# Patient Record
Sex: FEMALE | Race: WHITE | NOT HISPANIC OR LATINO | Employment: OTHER | URBAN - METROPOLITAN AREA
[De-identification: names, ages, dates, MRNs, and addresses within clinical notes are randomized per-mention and may not be internally consistent; named-entity substitution may affect disease eponyms.]

---

## 2018-11-06 PROBLEM — K63.1 PERFORATION BOWEL: Status: ACTIVE | Noted: 2018-11-06

## 2018-11-06 PROBLEM — Z12.11 COLON CANCER SCREENING: Status: ACTIVE | Noted: 2018-11-06

## 2018-11-07 PROBLEM — K63.1 PERFORATION BOWEL: Status: ACTIVE | Noted: 2018-11-07

## 2018-11-07 PROBLEM — E78.5 HYPERLIPIDEMIA: Status: ACTIVE | Noted: 2018-11-07

## 2018-11-07 PROBLEM — I10 ESSENTIAL HYPERTENSION: Status: ACTIVE | Noted: 2018-11-07

## 2018-11-07 PROBLEM — K63.1 PERFORATION BOWEL: Status: RESOLVED | Noted: 2018-11-06 | Resolved: 2018-11-07

## 2018-11-08 PROBLEM — Z90.49 S/P LAPAROSCOPIC COLECTOMY: Chronic | Status: ACTIVE | Noted: 2018-11-08

## 2018-11-09 PROBLEM — E87.6 HYPOKALEMIA: Status: ACTIVE | Noted: 2018-11-09

## 2018-11-09 PROBLEM — E83.42 HYPOMAGNESEMIA: Status: ACTIVE | Noted: 2018-11-09

## 2018-11-09 PROBLEM — K56.7 ILEUS: Status: ACTIVE | Noted: 2018-11-09

## 2018-11-10 PROBLEM — Z90.49 S/P LAPAROSCOPIC COLECTOMY: Chronic | Status: RESOLVED | Noted: 2018-11-08 | Resolved: 2018-11-10

## 2018-11-10 PROBLEM — K63.1 PERFORATION BOWEL: Status: RESOLVED | Noted: 2018-11-07 | Resolved: 2018-11-10

## 2018-11-10 PROBLEM — E83.42 HYPOMAGNESEMIA: Status: RESOLVED | Noted: 2018-11-09 | Resolved: 2018-11-10

## 2018-11-10 PROBLEM — E87.6 HYPOKALEMIA: Status: RESOLVED | Noted: 2018-11-09 | Resolved: 2018-11-10

## 2018-11-10 PROBLEM — K56.7 ILEUS: Status: RESOLVED | Noted: 2018-11-09 | Resolved: 2018-11-10

## 2018-11-12 ENCOUNTER — TELEPHONE (OUTPATIENT)
Dept: SURGERY | Facility: CLINIC | Age: 58
End: 2018-11-12

## 2018-11-12 RX ORDER — AMOXICILLIN AND CLAVULANATE POTASSIUM 875; 125 MG/1; MG/1
1 TABLET, FILM COATED ORAL EVERY 12 HOURS
Qty: 10 TABLET | Refills: 0 | Status: SHIPPED | OUTPATIENT
Start: 2018-11-12 | End: 2018-11-17

## 2018-11-12 NOTE — TELEPHONE ENCOUNTER
Prescription of [facility]in sent to [facility] Per MD advised patient she can start to eat food as tolerated, and resume home medications as prescribed. Post-op appointment scheduled.

## 2018-11-12 NOTE — TELEPHONE ENCOUNTER
----- Message from Gavi Burton sent at 11/12/2018 10:16 AM CST -----  Type: Needs Medical Advice    Who Called:  Patient  Pharmacy name and phone #:    [facility] [place] [address]  [address]  [place] [address]  Phone: [phone] Fax: [phone]  Best Call Back Number:[phone] (home)     Additional Information: stating the pharmacy did not receive her antibiotics prescribed by Dr. Mcknight, was discharged from the hospital on Saturday, please contact

## 2018-11-12 NOTE — TELEPHONE ENCOUNTER
----- Message from Marizol Mock sent at 11/12/2018  8:32 AM CST -----  Contact: self  Patient would like a call back regarding her discharge 11/10/18. Patient has  Type: Needs Medical Advice    Who Called:  self  Symptoms (please be specific):  diarrhea  How long has patient had these symptoms:  Discharged from [facility] 11/10/18  Best Call Back Number: [phone]  Additional Information: patient needs to know when she will be able to stop eating bland foods. Please call patient. Thanks!

## 2018-11-13 ENCOUNTER — TELEPHONE (OUTPATIENT)
Dept: SURGERY | Facility: CLINIC | Age: 58
End: 2018-11-13

## 2018-11-13 ENCOUNTER — NURSE TRIAGE (OUTPATIENT)
Dept: ADMINISTRATIVE | Facility: CLINIC | Age: 58
End: 2018-11-13

## 2018-11-13 RX ORDER — CIPROFLOXACIN 500 MG/1
500 TABLET ORAL EVERY 12 HOURS
Qty: 10 TABLET | Refills: 0 | Status: SHIPPED | OUTPATIENT
Start: 2018-11-13 | End: 2019-05-03 | Stop reason: ALTCHOICE

## 2018-11-13 NOTE — TELEPHONE ENCOUNTER
----- Message from Marizol Mock sent at 11/13/2018  9:07 AM CST -----  Contact: Susanna with [facility]  Susanna with [facility] [phone] called regarding Augmentin for above patient. They are requesting one of the following: Rx substitution due to causing stomach problems. Thanks!    [facility] [place] [address]  [address]  [place] [address]  Phone: [phone] Fax: [phone]

## 2018-11-13 NOTE — TELEPHONE ENCOUNTER
Spoke with patient regarding her symptoms. Patient stated she experienced stomach pain and projectile vomiting after taking 1 dose of Augmentin. Discussed this matter with Dr Mcknight. Patient has been informed she is to stop the Augmentin and a new order for Cipro was placed by MD to be picked up at her pharmacy. Patient indicated understanding of the information provided to her. No further issues discussed.

## 2018-11-14 NOTE — TELEPHONE ENCOUNTER
Reason for Disposition   Caller has medication question, adult has minor symptoms, caller declines triage, and triager answers question    Protocols used: ST MEDICATION QUESTION CALL-A-AH    Pt asking if she can eat with cipro. Pt notified she can eat regular diet with cipro, but to avoid dairy products for approx 2 hours before and after cipro. Pt verbalizes understanding of instructions.

## 2018-11-19 ENCOUNTER — TELEPHONE (OUTPATIENT)
Dept: SURGERY | Facility: CLINIC | Age: 58
End: 2018-11-19

## 2018-11-19 RX ORDER — FLUCONAZOLE 100 MG/1
100 TABLET ORAL DAILY
Qty: 30 TABLET | Refills: 0 | Status: SHIPPED | OUTPATIENT
Start: 2018-11-19 | End: 2018-12-19

## 2018-11-19 NOTE — TELEPHONE ENCOUNTER
----- Message from America Gore sent at 11/19/2018  8:40 AM CST -----  Contact: Patient  Type: Needs Medical Advice    Who Called:  Fiona patient  Symptoms (please be specific):  Burning around surgical area  How long has patient had these symptoms:  Several days  Pharmacy name and phone #:    [facility] [place] [address]  [address]  [place] [address]  Phone: [phone] Fax: [phone]  Best Call Back Number: [phone]  Additional Information: Calling to ask for a antibiotic. Please advise. Thanks.

## 2018-11-19 NOTE — TELEPHONE ENCOUNTER
Spoke with patient regarding her symptoms. Patient stated she was experiencing a burning sensation when she urinates. Patient recently was taking an antibiotic. Discussed this matter with Dr Mcknight and he ordered Diflucan. Informed patient about the new medication that was ordered, indicated understanding. No further issues discussed.

## 2018-11-21 ENCOUNTER — OFFICE VISIT (OUTPATIENT)
Dept: SURGERY | Facility: CLINIC | Age: 58
End: 2018-11-21
Payer: MEDICAID

## 2018-11-21 VITALS
DIASTOLIC BLOOD PRESSURE: 75 MMHG | SYSTOLIC BLOOD PRESSURE: 131 MMHG | BODY MASS INDEX: 25.34 KG/M2 | HEART RATE: 97 BPM | HEIGHT: 65 IN | WEIGHT: 152.13 LBS

## 2018-11-21 DIAGNOSIS — F17.200 SMOKER: Primary | ICD-10-CM

## 2018-11-21 PROCEDURE — 99213 OFFICE O/P EST LOW 20 MIN: CPT | Mod: PBBFAC,PN | Performed by: SURGERY

## 2018-11-21 PROCEDURE — 99999 PR PBB SHADOW E&M-EST. PATIENT-LVL III: CPT | Mod: PBBFAC,,, | Performed by: SURGERY

## 2018-11-21 PROCEDURE — 99024 POSTOP FOLLOW-UP VISIT: CPT | Mod: ,,, | Performed by: SURGERY

## 2018-11-21 RX ORDER — HYDROCODONE BITARTRATE AND ACETAMINOPHEN 10; 325 MG/1; MG/1
TABLET ORAL
Refills: 0 | COMMUNITY
Start: 2018-11-12 | End: 2022-08-11

## 2018-11-21 NOTE — PROGRESS NOTES
"Fiona Hernandez is a 58 y.o. female patient.   2 weeks s/p Lap LAR  Doing well  Ezra diet  Told ok to shower  States ezra diet  BM mostly normal  Some pains but controlled    No diagnosis found.  Past Medical History:   Diagnosis Date    Back pain     Bowel perforation 11/06/2018    Diverticulitis     Hyperlipidemia     Hypertension     Peritoneal cavity free air 11/06/2018     No past surgical history pertinent negatives on file.  Scheduled Meds:  Continuous Infusions:  PRN Meds:    Review of patient's allergies indicates:   Allergen Reactions    Sulfa (sulfonamide antibiotics) Shortness Of Breath     There are no hospital problems to display for this patient.    Blood pressure 131/75, pulse 97, height 5' 5" (1.651 m), weight 69 kg (152 lb 1.9 oz).    Subjective:   Diet: Adequate intake.  Patient reports no nausea.    Activity level: Normal.    Pain control: Well controlled.      Objective:  Vital signs (most recent): Blood pressure 131/75, pulse 97, height 5' 5" (1.651 m), weight 69 kg (152 lb 1.9 oz).  General appearance: Comfortable.    Lungs:  Normal effort.    Heart: Normal rate.    Abdomen: Abdomen is soft.    Bowel sounds:  Bowel sounds are normal.    Tenderness: There is no abdominal tenderness tenderness.    Wound:  Clean.    Path reviewed-ischemic necrosis, contained perforation     Assessment:   Condition: In stable condition.       s/p Lap colon  RTC 1 month       Cedric Mcknight MD  11/21/2018  "

## 2018-12-10 ENCOUNTER — TELEPHONE (OUTPATIENT)
Dept: SURGERY | Facility: CLINIC | Age: 58
End: 2018-12-10

## 2018-12-10 NOTE — TELEPHONE ENCOUNTER
----- Message from Bushra Burgess sent at 12/10/2018  4:46 PM CST -----  Contact: [phone]  Patient is requesting a call back from the nurse concerning lab and upcoming appt.    Please call the patient upon request at phone number [phone].

## 2018-12-10 NOTE — TELEPHONE ENCOUNTER
Returned patient's call reference to question about lab work. Patient's inquiry is are recent labs available for upcoming  visit Patient was assured all of recent labs are available for upcoming visit on 12/14/2018 No further issues were discussed.

## 2018-12-14 ENCOUNTER — OFFICE VISIT (OUTPATIENT)
Dept: SURGERY | Facility: CLINIC | Age: 58
End: 2018-12-14
Payer: MEDICAID

## 2018-12-14 VITALS
SYSTOLIC BLOOD PRESSURE: 136 MMHG | HEART RATE: 78 BPM | DIASTOLIC BLOOD PRESSURE: 80 MMHG | WEIGHT: 152.56 LBS | BODY MASS INDEX: 24.52 KG/M2 | HEIGHT: 66 IN

## 2018-12-14 DIAGNOSIS — Z98.890 POST-OPERATIVE STATE: Primary | ICD-10-CM

## 2018-12-14 PROCEDURE — 99213 OFFICE O/P EST LOW 20 MIN: CPT | Mod: PBBFAC,PN | Performed by: SURGERY

## 2018-12-14 PROCEDURE — 99999 PR PBB SHADOW E&M-EST. PATIENT-LVL III: CPT | Mod: PBBFAC,,, | Performed by: SURGERY

## 2018-12-14 PROCEDURE — 99024 POSTOP FOLLOW-UP VISIT: CPT | Mod: ,,, | Performed by: SURGERY

## 2018-12-14 NOTE — PROGRESS NOTES
"Fiona Hernandez is a 58 y.o. female patient.   She is 4 weeks s/p Lap LAR  Doing great  ezra diet  Some lower abd pains, intermittent but better    Incisions well healed  No diagnosis found.  Past Medical History:   Diagnosis Date    Back pain     Bowel perforation 11/06/2018    Diverticulitis     Hyperlipidemia     Hypertension     Peritoneal cavity free air 11/06/2018     No past surgical history pertinent negatives on file.  Scheduled Meds:  Continuous Infusions:  PRN Meds:    Review of patient's allergies indicates:   Allergen Reactions    Sulfa (sulfonamide antibiotics) Shortness Of Breath     There are no hospital problems to display for this patient.    Blood pressure 136/80, pulse 78, height 5' 6" (1.676 m), weight 69.2 kg (152 lb 8.9 oz).    Subjective:   Diet: Adequate intake.    Pain control: Well controlled.      Objective:  Vital signs (most recent): Blood pressure 136/80, pulse 78, height 5' 6" (1.676 m), weight 69.2 kg (152 lb 8.9 oz).  General appearance: Comfortable.    Lungs:  Normal effort.    Heart: Normal rate.    Abdomen: Abdomen is soft.    Bowel sounds:  Bowel sounds are normal.    Tenderness: There is no abdominal tenderness tenderness.    Wound:  Clean.       Assessment:   Condition: In stable condition.       1 month s/p Lap LAR, for perforated viscus  Follow up with GI for completion colonoscopy  RTC PRN       Cedric Mcknight MD  12/14/2018  "

## 2019-04-15 ENCOUNTER — OFFICE VISIT (OUTPATIENT)
Dept: SURGERY | Facility: CLINIC | Age: 59
End: 2019-04-15
Payer: MEDICAID

## 2019-04-15 VITALS
BODY MASS INDEX: 24.04 KG/M2 | DIASTOLIC BLOOD PRESSURE: 71 MMHG | HEART RATE: 86 BPM | WEIGHT: 148.94 LBS | SYSTOLIC BLOOD PRESSURE: 131 MMHG

## 2019-04-15 DIAGNOSIS — R10.30 LOWER ABDOMINAL PAIN: Primary | ICD-10-CM

## 2019-04-15 DIAGNOSIS — R10.84 GENERALIZED ABDOMINAL PAIN: ICD-10-CM

## 2019-04-15 PROCEDURE — 99999 PR PBB SHADOW E&M-EST. PATIENT-LVL III: ICD-10-PCS | Mod: PBBFAC,,, | Performed by: SURGERY

## 2019-04-15 PROCEDURE — 99214 OFFICE O/P EST MOD 30 MIN: CPT | Mod: S$PBB,,, | Performed by: SURGERY

## 2019-04-15 PROCEDURE — 99214 PR OFFICE/OUTPT VISIT, EST, LEVL IV, 30-39 MIN: ICD-10-PCS | Mod: S$PBB,,, | Performed by: SURGERY

## 2019-04-15 PROCEDURE — 99999 PR PBB SHADOW E&M-EST. PATIENT-LVL III: CPT | Mod: PBBFAC,,, | Performed by: SURGERY

## 2019-04-15 PROCEDURE — 99213 OFFICE O/P EST LOW 20 MIN: CPT | Mod: PBBFAC,PN | Performed by: SURGERY

## 2019-04-15 NOTE — PROGRESS NOTES
History & Physical    SUBJECTIVE:     History of Present Illness:  Patient is a 59 y.o. female presents with lower abdominal/pelvic pain since surgery 5 months ago. States has trouble sleeping on her right side because her ribs hurt.  Also gets pain in pelvis and is unable to have intercourse, states she feels the pain in her pelvis.  She had a lap LAR done last November for a perforation during colonoscopy.  Discussed her symptoms and will get a CT scan to evaluate    Chief Complaint   Patient presents with    Pelvis - Pain, Numbness       Review of patient's allergies indicates:   Allergen Reactions    Sulfa (sulfonamide antibiotics) Shortness Of Breath       Current Outpatient Medications   Medication Sig Dispense Refill    amLODIPine (NORVASC) 5 MG tablet Take 5 mg by mouth once daily.      aspirin (ECOTRIN) 81 MG EC tablet Take 81 mg by mouth once daily.      atorvastatin (LIPITOR) 80 MG tablet Take 80 mg by mouth every evening.      carvedilol (COREG) 6.25 MG tablet Take 6.25 mg by mouth 2 (two) times daily with meals.      ciprofloxacin HCl (CIPRO) 500 MG tablet Take 1 tablet (500 mg total) by mouth every 12 (twelve) hours. 10 tablet 0    diazePAM (VALIUM) 10 MG Tab Take 10 mg by mouth 2 (two) times daily as needed.      gabapentin (NEURONTIN) 300 MG capsule Take 300 mg by mouth 2 (two) times daily.      HYDROcodone-acetaminophen (NORCO)  mg per tablet TK 1 T PO BID PRN P  0    lisinopril (PRINIVIL,ZESTRIL) 20 MG tablet Take 20 mg by mouth once daily.      pantoprazole (PROTONIX) 40 MG tablet Take 40 mg by mouth once daily.       No current facility-administered medications for this visit.        Past Medical History:   Diagnosis Date    Back pain     Bowel perforation 11/06/2018    Diverticulitis     Hyperlipidemia     Hypertension     Peritoneal cavity free air 11/06/2018     Past Surgical History:   Procedure Laterality Date    APPENDECTOMY  2017    CHOLECYSTECTOMY       COLECTOMY, LAPAROSCOPIC  11/7/2018    Per[facility]night [facility] at [facility]    COLONOSCOPY  11/06/2018    COLONOSCOPY N/A 11/6/2018    Performed by Cash Little MD at [facility] ENDO    CORONARY ANGIOPLASTY WITH STENT PLACEMENT  2016    1 stent     HYSTERECTOMY      LAPAROSCOPY, DIAGNOSTIC N/A 11/7/2018    Performed by [facility]ric [facility]night [facility] at [facility] OR     History reviewed. No pertinent family history.  Social History     Tobacco Use    Smoking status: Current Some Day Smoker    Smokeless tobacco: Never Used   Substance Use Topics    Alcohol use: Not on file    Drug use: Not on file        Review of Systems:  Review of Systems   Constitutional: Negative for appetite change, fatigue, fever and unexpected weight change.   HENT: Negative for sore throat and trouble swallowing.    Eyes: Negative.    Respiratory: Negative for cough, shortness of breath and wheezing.    Cardiovascular: Negative for chest pain and leg swelling.   Gastrointestinal: Positive for abdominal pain and rectal pain. Negative for abdominal distention, blood in stool, constipation, diarrhea, nausea and vomiting.   Endocrine: Negative.    Genitourinary: Negative.    Musculoskeletal: Negative for back pain.   Skin: Negative.  Negative for rash.   Allergic/Immunologic: Negative.    Neurological: Negative.    Hematological: Negative.    Psychiatric/Behavioral: Negative for confusion.       OBJECTIVE:     Vital Signs (Most Recent)  Pulse: 86 (04/15/19 1118)  BP: 131/71 (04/15/19 1118)     67.5 kg (148 lb 14.7 oz)     Physical Exam:  Physical Exam   Constitutional: She is oriented to person, place, and time. She appears well-developed and well-nourished.   HENT:   Head: Normocephalic and atraumatic.   Eyes: EOM are normal.   Neck: Normal range of motion.   Cardiovascular: Normal rate and normal heart sounds.   Pulmonary/Chest: Effort normal.   Abdominal: Soft. Bowel sounds are normal. She exhibits no distension and no mass.  There is tenderness (mild). There is no rebound and no guarding. No hernia.   Musculoskeletal: Normal range of motion.   Neurological: She is alert and oriented to person, place, and time.   Skin: Skin is warm and dry. Capillary refill takes less than 2 seconds.   Psychiatric: She has a normal mood and affect. Her behavior is normal.   Nursing note and vitals reviewed.        ASSESSMENT/PLAN:     Abdominal pain, pelvic and r side s/p lap LAR    PLAN:Plan     CT scan  RTC 2 weeks

## 2019-04-17 ENCOUNTER — NURSE TRIAGE (OUTPATIENT)
Dept: ADMINISTRATIVE | Facility: CLINIC | Age: 59
End: 2019-04-17

## 2019-04-18 NOTE — TELEPHONE ENCOUNTER
Pt is having fasting labs tomorrow morning at 11 am.She wasnted to make sure she can take her statin and gabapentin tonight, I advised her that is fine as her instructions tell her to take all regularly scheduled medications, caller agrees

## 2019-04-23 DIAGNOSIS — R10.84 GENERALIZED ABDOMINAL PAIN: ICD-10-CM

## 2019-04-29 ENCOUNTER — TELEPHONE (OUTPATIENT)
Dept: SURGERY | Facility: CLINIC | Age: 59
End: 2019-04-29

## 2019-04-29 NOTE — TELEPHONE ENCOUNTER
----- Message from Chiara Gary sent at 4/29/2019 11:33 AM CDT -----    Type: Needs Medical Advice    Who Called:  pt  Best Call Back Number: [phone]  Additional Information:  Pt  Calling to  Speak to the nurse about a  Ct scan and  Needs advice

## 2019-04-30 ENCOUNTER — TELEPHONE (OUTPATIENT)
Dept: ORTHOPEDICS | Facility: CLINIC | Age: 59
End: 2019-04-30

## 2019-04-30 NOTE — TELEPHONE ENCOUNTER
----- Message from Antolin Barreto sent at 4/30/2019  8:20 AM CDT -----  Contact: patient  Type: Needs Medical Advice    Who Called:  Patient  Symptoms (please be specific):    How long has patient had these symptoms:    Pharmacy name and phone #:    Best Call Back Number: [phone]  Additional Information: requesting a call back from Arcadio regarding procedure.

## 2019-05-03 ENCOUNTER — OFFICE VISIT (OUTPATIENT)
Dept: SURGERY | Facility: CLINIC | Age: 59
End: 2019-05-03
Payer: MEDICAID

## 2019-05-03 VITALS
BODY MASS INDEX: 24.11 KG/M2 | HEIGHT: 66 IN | DIASTOLIC BLOOD PRESSURE: 66 MMHG | SYSTOLIC BLOOD PRESSURE: 99 MMHG | WEIGHT: 150 LBS | HEART RATE: 63 BPM

## 2019-05-03 DIAGNOSIS — R10.30 LOWER ABDOMINAL PAIN: Primary | ICD-10-CM

## 2019-05-03 PROCEDURE — 99999 PR PBB SHADOW E&M-EST. PATIENT-LVL III: CPT | Mod: PBBFAC,,, | Performed by: SURGERY

## 2019-05-03 PROCEDURE — 99213 OFFICE O/P EST LOW 20 MIN: CPT | Mod: PBBFAC,PN | Performed by: SURGERY

## 2019-05-03 PROCEDURE — 99999 PR PBB SHADOW E&M-EST. PATIENT-LVL III: ICD-10-PCS | Mod: PBBFAC,,, | Performed by: SURGERY

## 2019-05-03 PROCEDURE — 99214 OFFICE O/P EST MOD 30 MIN: CPT | Mod: S$PBB,,, | Performed by: SURGERY

## 2019-05-03 PROCEDURE — 99214 PR OFFICE/OUTPT VISIT, EST, LEVL IV, 30-39 MIN: ICD-10-PCS | Mod: S$PBB,,, | Performed by: SURGERY

## 2019-05-03 NOTE — PROGRESS NOTES
"Fiona Hernandez is a 59 y.o. female patient.   She is 6 months status post colon resection for perforated colon during colonoscopy.  She states she has excessive gas from her bottom.  She also states that she gets tenderness and pain when she has attempted intercourse.  CT scan was ordered to evaluate her anastomosis.  CT scan did not show any significant abnormalities in her intestines, her anastomosis looks perfect with no narrowing, no fluid collection around it, and no worrisome findings in the pelvis.  She does have some atrophy of her vagina.  Discussed possibly using estrogen cream to help with this problem.  Otherwise patient is tolerating regular food having bowel movements is eating okay and just complains of pelvic and lower abdominal pains occasionally.    No diagnosis found.  Past Medical History:   Diagnosis Date    Back pain     Bowel perforation 11/06/2018    Diverticulitis     Hyperlipidemia     Hypertension     Peritoneal cavity free air 11/06/2018     No past surgical history pertinent negatives on file.  Scheduled Meds:  Continuous Infusions:  PRN Meds:    Review of patient's allergies indicates:   Allergen Reactions    Sulfa (sulfonamide antibiotics) Shortness Of Breath     There are no hospital problems to display for this patient.    Blood pressure 99/66, pulse 63, height 5' 6" (1.676 m), weight 68.1 kg (150 lb 0.4 oz).    Subjective:   Diet: Adequate intake.  Patient reports no nausea.    Activity level: Normal.    Pain control: Partially controlled.      Objective:  Vital signs (most recent): Blood pressure 99/66, pulse 63, height 5' 6" (1.676 m), weight 68.1 kg (150 lb 0.4 oz).  General appearance: Comfortable.    Lungs:  Normal effort.    Heart: Normal rate.    Abdomen: Abdomen is soft.    Bowel sounds:  Bowel sounds are normal.    Tenderness: There is no abdominal tenderness tenderness.    Wound:  Clean.    Extremities: There is normal range of motion.    Neurological: The patient is " alert.       Assessment:   Condition: In stable condition.       s/p Colectomy, 6 months ago  RTC PRN  CT scan negative       Cedric Mcknight MD  5/3/2019

## 2019-05-06 ENCOUNTER — TELEPHONE (OUTPATIENT)
Dept: SURGERY | Facility: CLINIC | Age: 59
End: 2019-05-06

## 2019-05-06 NOTE — TELEPHONE ENCOUNTER
----- Message from Sae Mojica sent at 5/6/2019 10:47 AM CDT -----  Contact: self   Patient want to speak with a nurse regarding some medical questions please call back at [phone] (home)

## 2019-05-06 NOTE — TELEPHONE ENCOUNTER
Spoke with patient about a request for a copy of the color imaging that Dr Mcknight showed her after her colon surgery on 11/7/18.

## 2019-05-07 NOTE — TELEPHONE ENCOUNTER
Pt called in requesting imaging in color from her surgery on 11/6/2018   ----- Message from RT Mike sent at 5/7/2019  2:20 PM CDT -----  Contact: pt    pt , requesting her original pictures back, been waiting since last week, thanks.

## 2019-06-28 PROBLEM — F41.1 GENERALIZED ANXIETY DISORDER: Status: ACTIVE | Noted: 2019-06-28

## 2019-06-28 PROBLEM — E78.00 HYPERCHOLESTEREMIA: Status: ACTIVE | Noted: 2019-06-28

## 2019-06-28 PROBLEM — I73.9 PAD (PERIPHERAL ARTERY DISEASE): Status: ACTIVE | Noted: 2019-06-28

## 2019-06-28 PROBLEM — M79.89 LEG SWELLING: Status: ACTIVE | Noted: 2019-06-28

## 2019-06-28 PROBLEM — F17.200 TOBACCO DEPENDENCE: Status: ACTIVE | Noted: 2019-06-28

## 2019-07-19 ENCOUNTER — TELEPHONE (OUTPATIENT)
Dept: SURGERY | Facility: CLINIC | Age: 59
End: 2019-07-19

## 2019-07-19 PROBLEM — I87.2 VENOUS INSUFFICIENCY (CHRONIC) (PERIPHERAL): Status: ACTIVE | Noted: 2019-06-28

## 2019-07-19 PROBLEM — I77.9 PAOD (PERIPHERAL ARTERIAL OCCLUSIVE DISEASE): Status: ACTIVE | Noted: 2019-07-19

## 2019-07-19 PROBLEM — L08.9 SKIN INFECTION, BACTERIAL: Status: ACTIVE | Noted: 2019-07-19

## 2019-07-19 PROBLEM — B96.89 SKIN INFECTION, BACTERIAL: Status: ACTIVE | Noted: 2019-07-19

## 2019-07-19 NOTE — TELEPHONE ENCOUNTER
----- Message from America Sofía sent at 7/19/2019 12:46 PM CDT -----  Contact: Patient  Type: Needs Medical Advice    Who Called:  Fiona, patient  Symptoms (please be specific):  Incision site is red and swollen  How long has patient had these symptoms:  Today  Pharmacy name and phone #:    [facility] [place] [place] - [address]  [address]  [place] [address]  Phone: [phone] Fax: [phone]  Best Call Back Number: [phone]  Additional Information: Calling because her incision site is red and swollen. Please call her. Thanks.

## 2019-07-19 NOTE — TELEPHONE ENCOUNTER
Spoke with patient. She is currently visiting her cardiologist, Dr. Garcia. I spoke with Dr. Garcia. He stated she does show infection with purulent drainage in the lower abdominal region. It has been drained. Patient to begin Clindamycin 300 mg and f/u with Dr. Mcknight. Appointment scheduled for 7/22/19. Patient aware of appointment date, time, and location. Patient verbalized understanding; no other issues discussed.

## 2019-07-22 ENCOUNTER — OFFICE VISIT (OUTPATIENT)
Dept: SURGERY | Facility: CLINIC | Age: 59
End: 2019-07-22
Payer: MEDICAID

## 2019-07-22 VITALS
DIASTOLIC BLOOD PRESSURE: 73 MMHG | BODY MASS INDEX: 23.98 KG/M2 | HEART RATE: 82 BPM | SYSTOLIC BLOOD PRESSURE: 127 MMHG | WEIGHT: 148.56 LBS

## 2019-07-22 DIAGNOSIS — T81.41XA POSTOPERATIVE STITCH ABSCESS: Primary | ICD-10-CM

## 2019-07-22 PROCEDURE — 99213 PR OFFICE/OUTPT VISIT, EST, LEVL III, 20-29 MIN: ICD-10-PCS | Mod: S$PBB,,, | Performed by: SURGERY

## 2019-07-22 PROCEDURE — 99999 PR PBB SHADOW E&M-EST. PATIENT-LVL III: ICD-10-PCS | Mod: PBBFAC,,, | Performed by: SURGERY

## 2019-07-22 PROCEDURE — 99213 OFFICE O/P EST LOW 20 MIN: CPT | Mod: S$PBB,,, | Performed by: SURGERY

## 2019-07-22 PROCEDURE — 99213 OFFICE O/P EST LOW 20 MIN: CPT | Mod: PBBFAC,PN | Performed by: SURGERY

## 2019-07-22 PROCEDURE — 99999 PR PBB SHADOW E&M-EST. PATIENT-LVL III: CPT | Mod: PBBFAC,,, | Performed by: SURGERY

## 2019-07-22 RX ORDER — ERGOCALCIFEROL 1.25 MG/1
50000 CAPSULE ORAL
COMMUNITY
End: 2023-08-31 | Stop reason: SDUPTHER

## 2019-07-29 NOTE — PROGRESS NOTES
History & Physical    SUBJECTIVE:     History of Present Illness:  Patient is a 59 y.o. female presents with small stitch abscess to RLQ incision site  It was drainedin clinic last week at her Cardiology visit  Looks clean and dry today, no cellulitis  She states she gets some occasional abd pain as well but nonspecific  Chief Complaint   Patient presents with    Wound Infection       Review of patient's allergies indicates:   Allergen Reactions    Sulfa (sulfonamide antibiotics) Shortness Of Breath       Current Outpatient Medications   Medication Sig Dispense Refill    amLODIPine (NORVASC) 5 MG tablet Take 5 mg by mouth once daily.      aspirin (ECOTRIN) 81 MG EC tablet Take 81 mg by mouth once daily.      atorvastatin (LIPITOR) 80 MG tablet Take 80 mg by mouth every evening.      carvedilol (COREG) 6.25 MG tablet Take 6.25 mg by mouth 2 (two) times daily with meals.      clindamycin (CLEOCIN) 300 MG capsule Take 1 capsule (300 mg total) by mouth every 8 (eight) hours. for 10 days 30 capsule 1    diazePAM (VALIUM) 10 MG Tab Take 10 mg by mouth 2 (two) times daily as needed.      ergocalciferol (VITAMIN D2) 50,000 unit Cap Take 50,000 Units by mouth every 7 days.      gabapentin (NEURONTIN) 300 MG capsule Take 300 mg by mouth 2 (two) times daily.      HYDROcodone-acetaminophen (NORCO)  mg per tablet TK 1 T PO BID PRN P  0    lisinopril (PRINIVIL,ZESTRIL) 20 MG tablet Take 20 mg by mouth once daily.      pantoprazole (PROTONIX) 40 MG tablet Take 40 mg by mouth once daily.       No current facility-administered medications for this visit.        Past Medical History:   Diagnosis Date    Back pain     Bowel perforation 11/06/2018    Diverticulitis     Hyperlipidemia     Hypertension     Peritoneal cavity free air 11/06/2018     Past Surgical History:   Procedure Laterality Date    APPENDECTOMY  2017    CHOLECYSTECTOMY      COLECTOMY, LAPAROSCOPIC  11/7/2018    Performed by Cedric WOODS  MD [place] at [facility]    COLONOSCOPY  11/06/2018    COLONOSCOPY N/A 11/6/2018    Performed by Cash Little MD at [facility] ENDO    CORONARY ANGIOPLASTY WITH STENT PLACEMENT  2016    1 stent     HYSTERECTOMY      LAPAROSCOPY, DIAGNOSTIC N/A 11/7/2018    Performed by [facility]edric Mcknight MD at [facility] OR     History reviewed. No pertinent family history.  Social History     Tobacco Use    Smoking status: Current Some Day Smoker    Smokeless tobacco: Never Used   Substance Use Topics    Alcohol use: Not on file    Drug use: Not on file        Review of Systems:  Review of Systems   Constitutional: Negative for appetite change, fatigue, fever and unexpected weight change.   HENT: Negative for sore throat and trouble swallowing.    Eyes: Negative.    Respiratory: Negative for cough, shortness of breath and wheezing.    Cardiovascular: Negative for chest pain and leg swelling.   Gastrointestinal: Negative for abdominal distention, abdominal pain, blood in stool, constipation, diarrhea, nausea and vomiting.   Endocrine: Negative.    Genitourinary: Negative.    Musculoskeletal: Negative for back pain.   Skin: Negative.  Negative for rash.   Allergic/Immunologic: Negative.    Neurological: Negative.    Hematological: Negative.    Psychiatric/Behavioral: Negative for confusion.       OBJECTIVE:     Vital Signs (Most Recent)  Pulse: 82 (07/22/19 1158)  BP: 127/73 (07/22/19 1158)     67.4 kg (148 lb 9.4 oz)     Physical Exam:  Physical Exam   Constitutional: She is oriented to person, place, and time. She appears well-developed and well-nourished.   HENT:   Head: Normocephalic and atraumatic.   Eyes: EOM are normal.   Neck: Normal range of motion.   Cardiovascular: Normal rate and normal heart sounds.   Pulmonary/Chest: Effort normal.   Abdominal: Soft. Bowel sounds are normal. She exhibits no distension. There is tenderness (mild). There is no rebound. No hernia.   Small pinhole area where she had drainage,  no sign of infection   Musculoskeletal: Normal range of motion.   Neurological: She is alert and oriented to person, place, and time.   Skin: Skin is warm and dry. Capillary refill takes less than 2 seconds.   Psychiatric: She has a normal mood and affect. Her behavior is normal.   Nursing note and vitals reviewed.      ASSESSMENT/PLAN:     Stitch abscess    PLAN:Plan     RTC PRN  Improved clinically  Cont po abx  No need for formal I and D

## 2019-08-05 ENCOUNTER — OFFICE VISIT (OUTPATIENT)
Dept: SURGERY | Facility: CLINIC | Age: 59
End: 2019-08-05
Payer: MEDICAID

## 2019-08-05 VITALS
BODY MASS INDEX: 23.86 KG/M2 | SYSTOLIC BLOOD PRESSURE: 128 MMHG | WEIGHT: 147.81 LBS | HEART RATE: 86 BPM | DIASTOLIC BLOOD PRESSURE: 83 MMHG

## 2019-08-05 DIAGNOSIS — T81.41XA POSTOPERATIVE STITCH ABSCESS: Primary | ICD-10-CM

## 2019-08-05 PROCEDURE — 99213 OFFICE O/P EST LOW 20 MIN: CPT | Mod: S$PBB,,, | Performed by: SURGERY

## 2019-08-05 PROCEDURE — 99999 PR PBB SHADOW E&M-EST. PATIENT-LVL III: CPT | Mod: PBBFAC,,, | Performed by: SURGERY

## 2019-08-05 PROCEDURE — 99213 OFFICE O/P EST LOW 20 MIN: CPT | Mod: PBBFAC,PN | Performed by: SURGERY

## 2019-08-05 PROCEDURE — 99213 PR OFFICE/OUTPT VISIT, EST, LEVL III, 20-29 MIN: ICD-10-PCS | Mod: S$PBB,,, | Performed by: SURGERY

## 2019-08-05 PROCEDURE — 99999 PR PBB SHADOW E&M-EST. PATIENT-LVL III: ICD-10-PCS | Mod: PBBFAC,,, | Performed by: SURGERY

## 2019-08-05 NOTE — PROGRESS NOTES
History & Physical    SUBJECTIVE:     History of Present Illness:  Patient is a 59 y.o. female presents with here for re-evaluation of her incision.  She states she still has discomfort in the area, but the incision is completely closed and there is no drainage.  She is concerned about a small white speck on her skin which she states is tender.  There is no erythema or cellulitis around the entire area.      Chief Complaint   Patient presents with    Follow-up     incision       Review of patient's allergies indicates:   Allergen Reactions    Sulfa (sulfonamide antibiotics) Shortness Of Breath       Current Outpatient Medications   Medication Sig Dispense Refill    amLODIPine (NORVASC) 5 MG tablet Take 5 mg by mouth once daily.      aspirin (ECOTRIN) 81 MG EC tablet Take 81 mg by mouth once daily.      atorvastatin (LIPITOR) 80 MG tablet Take 80 mg by mouth every evening.      carvedilol (COREG) 6.25 MG tablet Take 6.25 mg by mouth 2 (two) times daily with meals.      diazePAM (VALIUM) 10 MG Tab Take 10 mg by mouth 2 (two) times daily as needed.      ergocalciferol (VITAMIN D2) 50,000 unit Cap Take 50,000 Units by mouth every 7 days.      gabapentin (NEURONTIN) 300 MG capsule Take 300 mg by mouth 2 (two) times daily.      HYDROcodone-acetaminophen (NORCO)  mg per tablet TK 1 T PO BID PRN P  0    lisinopril (PRINIVIL,ZESTRIL) 20 MG tablet Take 20 mg by mouth once daily.      pantoprazole (PROTONIX) 40 MG tablet Take 40 mg by mouth once daily.       No current facility-administered medications for this visit.        Past Medical History:   Diagnosis Date    Back pain     Bowel perforation 11/06/2018    Diverticulitis     Hyperlipidemia     Hypertension     Peritoneal cavity free air 11/06/2018     Past Surgical History:   Procedure Laterality Date    APPENDECTOMY  2017    CHOLECYSTECTOMY      COLECTOMY, LAPAROSCOPIC  11/7/2018    Performed by Cedric Mcknight [facility] at [facility] OR     COLONOSCOPY  11/06/2018    COLONOSCOPY N/A 11/6/2018    Performed by Cash Little MD at [facility] ENDO    CORONARY ANGIOPLASTY WITH STENT PLACEMENT  2016    1 stent     HYSTERECTOMY      LAPAROSCOPY, DIAGNOSTIC N/A 11/7/2018    Performed by Cedric Mcknight MD at [facility] OR     History reviewed. No pertinent family history.  Social History     Tobacco Use    Smoking status: Current Some Day Smoker    Smokeless tobacco: Never Used   Substance Use Topics    Alcohol use: Not on file    Drug use: Not on file        Review of Systems:  Review of Systems   Constitutional: Negative for appetite change, fatigue, fever and unexpected weight change.   HENT: Negative for sore throat and trouble swallowing.    Eyes: Negative.    Respiratory: Negative for cough, shortness of breath and wheezing.    Cardiovascular: Negative for chest pain and leg swelling.   Gastrointestinal: Negative for abdominal distention, abdominal pain, blood in stool, constipation, diarrhea, nausea and vomiting.   Endocrine: Negative.    Genitourinary: Negative.    Musculoskeletal: Negative for back pain.   Skin: Negative.  Negative for rash.   Allergic/Immunologic: Negative.    Neurological: Negative.    Hematological: Negative.    Psychiatric/Behavioral: Negative for confusion.       OBJECTIVE:     Vital Signs (Most Recent)  Pulse: 86 (08/05/19 1015)  BP: 128/83 (08/05/19 1015)     67 kg (147 lb 13.1 oz)     Physical Exam:  Physical Exam   Constitutional: She is oriented to person, place, and time. She appears well-developed and well-nourished.   HENT:   Head: Normocephalic and atraumatic.   Eyes: EOM are normal.   Neck: Normal range of motion.   Cardiovascular: Normal rate and normal heart sounds.   Pulmonary/Chest: Effort normal.   Abdominal: Soft. Bowel sounds are normal. She exhibits no distension. There is no tenderness.   Left lower quadrant incision well healed, no cellulitis or sign of infection.   Musculoskeletal: Normal range of  motion.   Neurological: She is alert and oriented to person, place, and time.   Skin: Skin is warm and dry. Capillary refill takes less than 2 seconds.   Psychiatric: She has a normal mood and affect. Her behavior is normal.   Nursing note and vitals reviewed.      ASSESSMENT/PLAN:     Patient with abdominal pain at incision    PLAN:Plan     Return to clinic p.r.n.

## 2020-01-22 PROBLEM — Z82.49 FH: CAD (CORONARY ARTERY DISEASE): Status: ACTIVE | Noted: 2020-01-22

## 2020-01-22 PROBLEM — R06.09 DYSPNEA ON EXERTION: Status: ACTIVE | Noted: 2020-01-22

## 2020-02-24 PROBLEM — M79.605 PAIN IN BOTH LOWER EXTREMITIES: Status: ACTIVE | Noted: 2020-02-24

## 2020-02-24 PROBLEM — M79.604 PAIN IN BOTH LOWER EXTREMITIES: Status: ACTIVE | Noted: 2020-02-24

## 2020-03-10 ENCOUNTER — OFFICE VISIT (OUTPATIENT)
Dept: SURGERY | Facility: CLINIC | Age: 60
End: 2020-03-10
Payer: MEDICAID

## 2020-03-10 VITALS
BODY MASS INDEX: 23.5 KG/M2 | HEART RATE: 73 BPM | SYSTOLIC BLOOD PRESSURE: 128 MMHG | OXYGEN SATURATION: 97 % | TEMPERATURE: 98 F | WEIGHT: 146.25 LBS | RESPIRATION RATE: 18 BRPM | HEIGHT: 66 IN | DIASTOLIC BLOOD PRESSURE: 74 MMHG

## 2020-03-10 DIAGNOSIS — R10.30 LOWER ABDOMINAL PAIN: Primary | ICD-10-CM

## 2020-03-10 PROCEDURE — 99214 OFFICE O/P EST MOD 30 MIN: CPT | Mod: S$PBB,,, | Performed by: SURGERY

## 2020-03-10 PROCEDURE — 99214 PR OFFICE/OUTPT VISIT, EST, LEVL IV, 30-39 MIN: ICD-10-PCS | Mod: S$PBB,,, | Performed by: SURGERY

## 2020-03-10 PROCEDURE — 99213 OFFICE O/P EST LOW 20 MIN: CPT | Mod: PBBFAC,PN | Performed by: SURGERY

## 2020-03-10 PROCEDURE — 99999 PR PBB SHADOW E&M-EST. PATIENT-LVL III: CPT | Mod: PBBFAC,,, | Performed by: SURGERY

## 2020-03-10 PROCEDURE — 99999 PR PBB SHADOW E&M-EST. PATIENT-LVL III: ICD-10-PCS | Mod: PBBFAC,,, | Performed by: SURGERY

## 2020-03-13 NOTE — PROGRESS NOTES
History & Physical    SUBJECTIVE:     History of Present Illness:  Patient is a 60 y.o. female presents with follow-up from laparoscopic sigmoid colectomy and postoperative pain from over year ago.  Still complains that she has poor control over gas coming out of her rectum and I recommended her to do pelvic exercises.  She does have control of bowel movements but she states they are sometimes loose.  A CT scan had already been performed in the past which showed no abnormalities.  I have discussed with the to get a colonoscopy, but she refuses consents were how she had a perforation and needed surgery to begin with.        Chief Complaint   Patient presents with    Follow-up       Review of patient's allergies indicates:   Allergen Reactions    Sulfa (sulfonamide antibiotics) Shortness Of Breath       Current Outpatient Medications   Medication Sig Dispense Refill    amLODIPine (NORVASC) 5 MG tablet Take 5 mg by mouth once daily.      aspirin (ECOTRIN) 81 MG EC tablet Take 81 mg by mouth once daily.      atorvastatin (LIPITOR) 80 MG tablet Take 80 mg by mouth every evening.      carvedilol (COREG) 6.25 MG tablet Take 6.25 mg by mouth 2 (two) times daily with meals.      diazePAM (VALIUM) 10 MG Tab Take 10 mg by mouth 2 (two) times daily as needed.      ergocalciferol (VITAMIN D2) 50,000 unit Cap Take 50,000 Units by mouth every 7 days.      gabapentin (NEURONTIN) 300 MG capsule Take 300 mg by mouth 2 (two) times daily.      HYDROcodone-acetaminophen (NORCO)  mg per tablet TK 1 T PO BID PRN P  0    lisinopril (PRINIVIL,ZESTRIL) 20 MG tablet Take 20 mg by mouth once daily.      pantoprazole (PROTONIX) 40 MG tablet Take 40 mg by mouth once daily.       No current facility-administered medications for this visit.        Past Medical History:   Diagnosis Date    Back pain     Bowel perforation 11/06/2018    Diverticulitis     Hyperlipidemia     Hypertension     Peritoneal cavity free air  "11/06/2018     Past Surgical History:   Procedure Laterality Date    APPENDECTOMY  2017    CHOLECYSTECTOMY      COLONOSCOPY  11/06/2018    COLONOSCOPY N/A 11/6/2018    Procedure: COLONOSCOPY;  Surgeon: Cash Little MD;  Location: [facility] ENDO;  Service: General;  Laterality: N/A;    CORONARY ANGIOPLASTY WITH STENT PLACEMENT  2016    1 stent     DIAGNOSTIC LAPAROSCOPY N/A 11/7/2018    Procedure: LAPAROSCOPY, DIAGNOSTIC;  Surgeon: Cedric Mcknight MD;  Location: [facility] OR;  Service: General;  Laterality: N/A;    HYSTERECTOMY       History reviewed. No pertinent family history.  Social History     Tobacco Use    Smoking status: Current Some Day Smoker    Smokeless tobacco: Never Used   Substance Use Topics    Alcohol use: Not on file    Drug use: Not on file        Review of Systems:  Review of Systems   Constitutional: Negative for appetite change, fatigue, fever and unexpected weight change.   HENT: Negative for sore throat and trouble swallowing.    Eyes: Negative.    Respiratory: Negative for cough, shortness of breath and wheezing.    Cardiovascular: Negative for chest pain and leg swelling.   Gastrointestinal: Positive for abdominal pain (Lower) and rectal pain. Negative for abdominal distention, blood in stool, constipation, diarrhea, nausea and vomiting.   Endocrine: Negative.    Genitourinary: Negative.    Musculoskeletal: Negative for back pain.   Skin: Negative.  Negative for rash.   Allergic/Immunologic: Negative.    Neurological: Negative.    Hematological: Negative.    Psychiatric/Behavioral: Negative for confusion.       OBJECTIVE:     Vital Signs (Most Recent)  Temp: 98.3 °F (36.8 °C) (03/10/20 1106)  Pulse: 73 (03/10/20 1106)  Resp: 18 (03/10/20 1106)  BP: 128/74 (03/10/20 1106)  SpO2: 97 % (03/10/20 1106)  5' 6" (1.676 m)  66.3 kg (146 lb 4.4 oz)     Physical Exam:  Physical Exam   Constitutional: She is oriented to person, place, and time. She appears well-developed and " well-nourished.   HENT:   Head: Normocephalic and atraumatic.   Eyes: EOM are normal.   Neck: Normal range of motion.   Cardiovascular: Normal rate and normal heart sounds.   Pulmonary/Chest: Effort normal.   Abdominal: Soft. Bowel sounds are normal. She exhibits no distension. There is no tenderness.   Musculoskeletal: Normal range of motion.   Neurological: She is alert and oriented to person, place, and time.   Skin: Skin is warm and dry. Capillary refill takes less than 2 seconds.   Psychiatric: She has a normal mood and affect. Her behavior is normal.   Nursing note and vitals reviewed.    ASSESSMENT/PLAN:     60-year-old female with lower abdominal pain, poor control flatulence, and loose stools    PLAN:Plan     Recommended pelvic floor exercises.  Explain that she should have a colonoscopy since she has not had one since surgery

## 2020-07-08 PROBLEM — G47.62 NOCTURNAL LEG CRAMPS: Status: ACTIVE | Noted: 2020-07-08

## 2021-08-06 ENCOUNTER — IMMUNIZATION (OUTPATIENT)
Dept: PRIMARY CARE CLINIC | Facility: CLINIC | Age: 61
End: 2021-08-06
Payer: MEDICAID

## 2021-08-06 DIAGNOSIS — Z23 NEED FOR VACCINATION: Primary | ICD-10-CM

## 2021-08-09 ENCOUNTER — TELEPHONE (OUTPATIENT)
Dept: PEDIATRICS | Facility: CLINIC | Age: 61
End: 2021-08-09

## 2021-08-09 NOTE — TELEPHONE ENCOUNTER
Cyst on stomach from laparoscopy done 3 years ago- rates pain 6-7 out of 10.Has been bothering her for about 2-3 wks. Was on abx a week ago for 10 days (sinus infection).  Used black drawing salve, caused it to itch - Put benadryl cream on for itching. Went to clean with alcohol pad and noticed a bit of blood.   Requesting a call back on what she should do. Please advise, thank you.

## 2021-08-09 NOTE — TELEPHONE ENCOUNTER
----- Message from Dana Dexter sent at 8/9/2021  3:07 PM CDT -----  Regarding: speak with nurse  Contact: patient  [phone]   please call above patient ASAP said she need to speak with the nurse have post op question thanks.

## 2021-08-09 NOTE — TELEPHONE ENCOUNTER
Advised Dr Mcknight is not in clinic. Advised to contact PCP, she reported has appointment with Dr Early.

## 2021-08-09 NOTE — TELEPHONE ENCOUNTER
----- Message from Dana Dexter sent at 8/9/2021  9:50 AM CDT -----  Regarding: speak with nurse  Contact: patient  [phone]  please call above patient have question for the nurse concerning her incision from surgery said the next appointment is too far out waiting on a call back from the nurse thanks.

## 2021-08-12 ENCOUNTER — NURSE TRIAGE (OUTPATIENT)
Dept: ADMINISTRATIVE | Facility: CLINIC | Age: 61
End: 2021-08-12

## 2022-08-10 PROBLEM — R60.0 BILATERAL LEG EDEMA: Status: ACTIVE | Noted: 2022-08-10

## 2022-09-19 ENCOUNTER — TELEPHONE (OUTPATIENT)
Dept: GASTROENTEROLOGY | Facility: CLINIC | Age: 62
End: 2022-09-19
Payer: MEDICAID

## 2022-09-19 ENCOUNTER — TELEPHONE (OUTPATIENT)
Dept: SURGERY | Facility: CLINIC | Age: 62
End: 2022-09-19
Payer: MEDICAID

## 2022-09-19 NOTE — TELEPHONE ENCOUNTER
The orer will be put in and then a call to the patient to get scheduled.      ----- Message from Angie Reyes-Ruiz, MA sent at 9/19/2022  8:46 AM CDT -----  Regarding: FW: Appt Request    ----- Message -----  From: Agustina Miller  Sent: 9/19/2022   8:34 AM CDT  To: [facility]  Subject: Appt Request                                     Pt called to f/u on referral for Colon cancer screening.     Pts Call back: [phone]

## 2022-09-19 NOTE — TELEPHONE ENCOUNTER
This patient was called re: to a referral to have a Colonoscopy for colon cancer screening. Patient's preference is to have a GI consult before scheduling the Colonoscopy. The patient was advised someone will be reaching out to patient to schedule a GI consult..

## 2022-09-27 ENCOUNTER — TELEPHONE (OUTPATIENT)
Dept: GASTROENTEROLOGY | Facility: CLINIC | Age: 62
End: 2022-09-27
Payer: MEDICAID

## 2022-09-27 NOTE — TELEPHONE ENCOUNTER
----- Message from Marbella Nieto sent at 9/27/2022  9:36 AM CDT -----  Regarding: speak with office  Contact: Akira Reeves wants to be seen today for abdominal  pain     [phone] (home)

## 2022-09-27 NOTE — TELEPHONE ENCOUNTER
"I added the patient to the wait list.      ----- Message from Niki Day sent at 9/27/2022  8:52 AM CDT -----  Regarding: Reschedule Existing Appointment  Appt Date: 11/16    Type of appt : NP    Physician: Dr. Sheldon    Reason for rescheduling? Stomach problems are worse    Caller: self    Contact Preference: [phone]         Additional Information:  "Thank you for all that you do for our patients'     "

## 2022-10-20 ENCOUNTER — PATIENT OUTREACH (OUTPATIENT)
Dept: EMERGENCY MEDICINE | Facility: HOSPITAL | Age: 62
End: 2022-10-20
Payer: MEDICAID

## 2022-10-21 ENCOUNTER — PATIENT OUTREACH (OUTPATIENT)
Dept: EMERGENCY MEDICINE | Facility: HOSPITAL | Age: 62
End: 2022-10-21
Payer: MEDICAID

## 2022-10-26 ENCOUNTER — HOSPITAL ENCOUNTER (OUTPATIENT)
Dept: RADIOLOGY | Facility: OTHER | Age: 62
Discharge: HOME | End: 2022-10-26
Attending: INTERNAL MEDICINE
Payer: MEDICAID

## 2022-10-26 DIAGNOSIS — Z12.31 ENCOUNTER FOR SCREENING MAMMOGRAM FOR MALIGNANT NEOPLASM OF BREAST: ICD-10-CM

## 2022-10-26 PROCEDURE — 77067 SCR MAMMO BI INCL CAD: CPT | Mod: TC

## 2022-10-26 PROCEDURE — 77067 MAMMO DIGITAL SCREENING BILAT WITH TOMO: ICD-10-PCS | Mod: 26,,, | Performed by: RADIOLOGY

## 2022-10-26 PROCEDURE — 77067 SCR MAMMO BI INCL CAD: CPT | Mod: 26,,, | Performed by: RADIOLOGY

## 2022-10-26 PROCEDURE — 77063 BREAST TOMOSYNTHESIS BI: CPT | Mod: 26,,, | Performed by: RADIOLOGY

## 2022-10-26 PROCEDURE — 77063 MAMMO DIGITAL SCREENING BILAT WITH TOMO: ICD-10-PCS | Mod: 26,,, | Performed by: RADIOLOGY

## 2022-10-26 PROCEDURE — 77063 BREAST TOMOSYNTHESIS BI: CPT | Mod: TC

## 2022-11-04 ENCOUNTER — TELEPHONE (OUTPATIENT)
Dept: CARDIOLOGY | Facility: CLINIC | Age: 62
End: 2022-11-04
Payer: MEDICAID

## 2022-11-04 NOTE — TELEPHONE ENCOUNTER
----- Message from Gloria Monique sent at 11/4/2022 10:47 AM CDT -----  Regarding: Appt Needed  Contact: @[phone]  Pt requesting a call back regarding scheduling an appt for Cardiology.  Pt states she was seeing Dr. Garcia but she is unsure of where he went. Emergency Room doctor asked Fiona to schedule an appt with Dr. Pete.  (Pt went to ER for feet swelling and they were checking for blood clots).  Please call to discuss further.

## 2022-11-04 NOTE — TELEPHONE ENCOUNTER
Spoke with patient, scheduled new patient appointment next week.  Patient verbalized understanding.

## 2022-11-09 ENCOUNTER — OFFICE VISIT (OUTPATIENT)
Dept: CARDIOLOGY | Facility: CLINIC | Age: 62
End: 2022-11-09
Payer: MEDICAID

## 2022-11-09 VITALS
SYSTOLIC BLOOD PRESSURE: 120 MMHG | BODY MASS INDEX: 31.09 KG/M2 | HEIGHT: 60 IN | DIASTOLIC BLOOD PRESSURE: 58 MMHG | WEIGHT: 158.38 LBS | OXYGEN SATURATION: 98 % | HEART RATE: 71 BPM

## 2022-11-09 DIAGNOSIS — R60.9 EDEMA, UNSPECIFIED TYPE: ICD-10-CM

## 2022-11-09 DIAGNOSIS — I87.2 VENOUS INSUFFICIENCY (CHRONIC) (PERIPHERAL): ICD-10-CM

## 2022-11-09 DIAGNOSIS — E78.00 HYPERCHOLESTEREMIA: ICD-10-CM

## 2022-11-09 DIAGNOSIS — I77.9 PAOD (PERIPHERAL ARTERIAL OCCLUSIVE DISEASE): Primary | ICD-10-CM

## 2022-11-09 DIAGNOSIS — M79.672 PAIN IN BOTH FEET: ICD-10-CM

## 2022-11-09 DIAGNOSIS — I10 ESSENTIAL HYPERTENSION: ICD-10-CM

## 2022-11-09 DIAGNOSIS — M79.671 PAIN IN BOTH FEET: ICD-10-CM

## 2022-11-09 DIAGNOSIS — I49.3 PVC (PREMATURE VENTRICULAR CONTRACTION): ICD-10-CM

## 2022-11-09 PROBLEM — M79.673 FOOT PAIN: Status: ACTIVE | Noted: 2022-11-09

## 2022-11-09 PROCEDURE — 99999 PR PBB SHADOW E&M-EST. PATIENT-LVL IV: ICD-10-PCS | Mod: PBBFAC,,, | Performed by: INTERNAL MEDICINE

## 2022-11-09 PROCEDURE — 3008F PR BODY MASS INDEX (BMI) DOCUMENTED: ICD-10-PCS | Mod: CPTII,,, | Performed by: INTERNAL MEDICINE

## 2022-11-09 PROCEDURE — 99203 PR OFFICE/OUTPT VISIT, NEW, LEVL III, 30-44 MIN: ICD-10-PCS | Mod: S$PBB,25,, | Performed by: INTERNAL MEDICINE

## 2022-11-09 PROCEDURE — 3074F PR MOST RECENT SYSTOLIC BLOOD PRESSURE < 130 MM HG: ICD-10-PCS | Mod: CPTII,,, | Performed by: INTERNAL MEDICINE

## 2022-11-09 PROCEDURE — 93010 EKG 12-LEAD: ICD-10-PCS | Mod: S$PBB,,, | Performed by: INTERNAL MEDICINE

## 2022-11-09 PROCEDURE — 99999 PR PBB SHADOW E&M-EST. PATIENT-LVL IV: CPT | Mod: PBBFAC,,, | Performed by: INTERNAL MEDICINE

## 2022-11-09 PROCEDURE — 93005 ELECTROCARDIOGRAM TRACING: CPT | Mod: PBBFAC,PN | Performed by: INTERNAL MEDICINE

## 2022-11-09 PROCEDURE — 4010F PR ACE/ARB THEARPY RXD/TAKEN: ICD-10-PCS | Mod: CPTII,,, | Performed by: INTERNAL MEDICINE

## 2022-11-09 PROCEDURE — 3078F PR MOST RECENT DIASTOLIC BLOOD PRESSURE < 80 MM HG: ICD-10-PCS | Mod: CPTII,,, | Performed by: INTERNAL MEDICINE

## 2022-11-09 PROCEDURE — 99203 OFFICE O/P NEW LOW 30 MIN: CPT | Mod: S$PBB,25,, | Performed by: INTERNAL MEDICINE

## 2022-11-09 PROCEDURE — 3008F BODY MASS INDEX DOCD: CPT | Mod: CPTII,,, | Performed by: INTERNAL MEDICINE

## 2022-11-09 PROCEDURE — 99214 OFFICE O/P EST MOD 30 MIN: CPT | Mod: PBBFAC,PN | Performed by: INTERNAL MEDICINE

## 2022-11-09 PROCEDURE — 93010 ELECTROCARDIOGRAM REPORT: CPT | Mod: S$PBB,,, | Performed by: INTERNAL MEDICINE

## 2022-11-09 PROCEDURE — 3074F SYST BP LT 130 MM HG: CPT | Mod: CPTII,,, | Performed by: INTERNAL MEDICINE

## 2022-11-09 PROCEDURE — 1159F MED LIST DOCD IN RCRD: CPT | Mod: CPTII,,, | Performed by: INTERNAL MEDICINE

## 2022-11-09 PROCEDURE — 1159F PR MEDICATION LIST DOCUMENTED IN MEDICAL RECORD: ICD-10-PCS | Mod: CPTII,,, | Performed by: INTERNAL MEDICINE

## 2022-11-09 PROCEDURE — 3078F DIAST BP <80 MM HG: CPT | Mod: CPTII,,, | Performed by: INTERNAL MEDICINE

## 2022-11-09 PROCEDURE — 4010F ACE/ARB THERAPY RXD/TAKEN: CPT | Mod: CPTII,,, | Performed by: INTERNAL MEDICINE

## 2022-11-09 RX ORDER — FLUTICASONE PROPIONATE 50 MCG
1 SPRAY, SUSPENSION (ML) NASAL DAILY
COMMUNITY

## 2022-11-09 NOTE — PROGRESS NOTES
[facility] - Cardiology [address]  Cardiology Clinic Note      Chief Complaint  Chief Complaint   Patient presents with    Establish Care       HPI:    62-year-old female with tobacco use, hypertension, hyperlipidemia, IFG in past but normalized last check, PAD status post PVI left SFA 2016, venous insufficiency, stress echocardiogram negative for ischemia normal LVEF 2/2020    The patient has trace bilateral lower extremity edema and complains of foot pain described as burning and numbness bilaterally seen in the ER and diagnosed with peripheral neuropathy    Patient states that she is gaining weight and it may be that she is eating more because she is depressed that her  had passed away approximately 5 months ago    PVCs noted on EKG    Denies history of CAD    Medications  Current Outpatient Medications   Medication Sig Dispense Refill    amLODIPine (NORVASC) 5 MG tablet Take 5 mg by mouth once daily.      aspirin (ECOTRIN) 81 MG EC tablet Take 81 mg by mouth once daily.      atorvastatin (LIPITOR) 80 MG tablet Take 80 mg by mouth every evening.      carvediloL (COREG) 6.25 MG tablet Take 1 tablet (6.25 mg total) by mouth 2 (two) times daily with meals. 60 tablet 3    diazePAM (VALIUM) 10 MG Tab Take 10 mg by mouth 2 (two) times daily as needed.      ergocalciferol (ERGOCALCIFEROL) 50,000 unit Cap Take 50,000 Units by mouth every 7 days.      fluticasone propionate (FLONASE) 50 mcg/actuation nasal spray 1 spray by Each Nostril route once daily.      gabapentin (NEURONTIN) 300 MG capsule Take 300 mg by mouth 2 (two) times daily.      lisinopriL (PRINIVIL,ZESTRIL) 20 MG tablet Take 1 tablet (20 mg total) by mouth once daily. 30 tablet 3    loratadine (CLARITIN) 10 mg tablet Take 1 tablet (10 mg total) by mouth once daily. 30 tablet 0    ondansetron (ZOFRAN-ODT) 4 MG TbDL Take 1 tablet (4 mg total) by mouth every 6 (six) hours as needed (nausea and/or vomiting). (Patient not taking: Reported on 11/9/2022) 16  tablet 0    pantoprazole (PROTONIX) 40 MG tablet Take 1 tablet (40 mg total) by mouth once daily. (Patient not taking: Reported on 11/9/2022) 30 tablet 3     No current facility-administered medications for this visit.        History  Past Medical History:   Diagnosis Date    Back pain     Bowel perforation 11/06/2018    Diverticulitis     Hyperlipidemia     Hypertension     Peritoneal cavity free air 11/06/2018     Past Surgical History:   Procedure Laterality Date    APPENDECTOMY  2017    CHOLECYSTECTOMY      COLONOSCOPY  11/06/2018    COLONOSCOPY N/A 11/6/2018    Procedure: COLONOSCOPY;  Surgeon: Cash Little MD;  Location: [facility] ENDO;  Service: General;  Laterality: N/A;    CORONARY ANGIOPLASTY WITH STENT PLACEMENT  2016    1 stent     DIAGNOSTIC LAPAROSCOPY N/A 11/7/2018    Procedure: LAPAROSCOPY, DIAGNOSTIC;  Surgeon: Cedric Mcknight MD;  Location: [facility] OR;  Service: General;  Laterality: N/A;    HYSTERECTOMY       Social History     Socioeconomic History    Marital status:    Tobacco Use    Smoking status: Some Days     Packs/day: 0.50     Years: 20.00     Pack years: 10.00     Types: Cigarettes    Smokeless tobacco: Never   Substance and Sexual Activity    Alcohol use: Never    Drug use: Not Currently     Social Determinants of Health     Financial Resource Strain: Medium Risk    Difficulty of Paying Living Expenses: Somewhat hard   Food Insecurity: No Food Insecurity    Worried About Running Out of Food in the Last Year: Never true    Ran Out of Food in the Last Year: Never true   Transportation Needs: No Transportation Needs    Lack of Transportation (Medical): No    Lack of Transportation (Non-Medical): No   Physical Activity: Inactive    Days of Exercise per Week: 0 days    Minutes of Exercise per Session: 0 min   Stress: Stress Concern Present    Feeling of Stress : To some extent   Social Connections: Moderately Isolated    Frequency of Communication with Friends and Family: Never     Frequency of Social Gatherings with Friends and Family: Never    Attends [employer] Services: More than 4 times per year    Active Member of Clubs or Organizations: Yes    Attends Club or Organization Meetings: Never    Marital Status:    Housing Stability: Low Risk     Unable to Pay for Housing in the Last Year: No    Number of Places Lived in the Last Year: 1    Unstable Housing in the Last Year: No     No family history on file.     Allergies  Review of patient's allergies indicates:   Allergen Reactions    Sulfa (sulfonamide antibiotics) Shortness Of Breath       Review of Systems   Review of Systems   Constitutional: Negative for fever.   HENT:  Negative for nosebleeds.    Eyes:  Negative for visual disturbance.   Cardiovascular:  Negative for chest pain, claudication, dyspnea on exertion, palpitations and syncope.   Respiratory:  Negative for cough, hemoptysis and wheezing.    Endocrine: Negative for cold intolerance, heat intolerance, polyphagia and polyuria.   Hematologic/Lymphatic: Negative for bleeding problem.   Skin:  Negative for rash.   Musculoskeletal:  Negative for myalgias.   Gastrointestinal:  Negative for hematemesis, hematochezia, nausea and vomiting.   Genitourinary:  Negative for dysuria.   Neurological:  Positive for sensory change. Negative for focal weakness.        Bilateral and symmetric numbness and burning of the feet chronic   Psychiatric/Behavioral:  Negative for altered mental status.      Physical Exam  Vitals:    11/09/22 1258   BP: (!) 120/58   Pulse: 71     Wt Readings from Last 1 Encounters:   11/09/22 71.9 kg (158 lb 6.4 oz)     Physical Exam  Constitutional:       General: She is not in acute distress.  HENT:      Head: Normocephalic and atraumatic.      Mouth/Throat:      Mouth: Mucous membranes are moist.   Eyes:      Extraocular Movements: Extraocular movements intact.      Pupils: Pupils are equal, round, and reactive to light.   Neck:      Vascular: No carotid  bruit or JVD.   Cardiovascular:      Rate and Rhythm: Normal rate and regular rhythm.      Pulses:           Dorsalis pedis pulses are 1+ on the right side and 1+ on the left side.      Heart sounds: No murmur heard.    No friction rub. No gallop.   Pulmonary:      Effort: Pulmonary effort is normal.      Breath sounds: Normal breath sounds.   Abdominal:      Tenderness: There is no abdominal tenderness. There is no guarding or rebound.   Musculoskeletal:      Right lower leg: No edema.      Left lower leg: No edema.   Skin:     General: Skin is warm and dry.      Capillary Refill: Capillary refill takes less than 2 seconds.   Neurological:      General: No focal deficit present.      Mental Status: She is alert.   Psychiatric:         Mood and Affect: Mood normal.       Labs  Admission on 11/03/2022, Discharged on 11/03/2022   Component Date Value Ref Range Status    WBC 11/03/2022 10.14  3.90 - 12.70 K/uL Final    RBC 11/03/2022 4.28  4.00 - 5.40 M/uL Final    Hemoglobin 11/03/2022 13.0  12.0 - 16.0 g/dL Final    Hematocrit 11/03/2022 38.9  37.0 - 48.5 % Final    MCV 11/03/2022 91  82 - 98 fL Final    MCH 11/03/2022 30.4  27.0 - 31.0 pg Final    MCHC 11/03/2022 33.4  32.0 - 36.0 g/dL Final    RDW 11/03/2022 13.7  11.5 - 14.5 % Final    Platelets 11/03/2022 229  150 - 450 K/uL Final    MPV 11/03/2022 10.4  9.2 - 12.9 fL Final    Immature Granulocytes 11/03/2022 0.4  0.0 - 0.5 % Final    Gran # (ANC) 11/03/2022 6.0  1.8 - 7.7 K/uL Final    Immature Grans (Abs) 11/03/2022 0.04  0.00 - 0.04 K/uL Final    Comment: Mild elevation in immature granulocytes is non specific and   can be seen in a variety of conditions including stress response,   acute inflammation, trauma and pregnancy. Correlation with other   laboratory and clinical findings is essential.      Lymph # 11/03/2022 2.9  1.0 - 4.8 K/uL Final    Mono # 11/03/2022 0.9  0.3 - 1.0 K/uL Final    Eos # 11/03/2022 0.3  0.0 - 0.5 K/uL Final    Baso # 11/03/2022 0.04   0.00 - 0.20 K/uL Final    nRBC 11/03/2022 0  0 /100 WBC Final    Gran % 11/03/2022 59.0  38.0 - 73.0 % Final    Lymph % 11/03/2022 28.3  18.0 - 48.0 % Final    Mono % 11/03/2022 8.8  4.0 - 15.0 % Final    Eosinophil % 11/03/2022 3.1  0.0 - 8.0 % Final    Basophil % 11/03/2022 0.4  0.0 - 1.9 % Final    Differential Method 11/03/2022 Automated   Final    Sodium 11/03/2022 142  136 - 145 mmol/L Final    Potassium 11/03/2022 4.1  3.5 - 5.1 mmol/L Final    Chloride 11/03/2022 107  95 - 110 mmol/L Final    CO2 11/03/2022 26  23 - 29 mmol/L Final    Glucose 11/03/2022 87  70 - 110 mg/dL Final    BUN 11/03/2022 15  8 - 23 mg/dL Final    Creatinine 11/03/2022 1.1  0.5 - 1.4 mg/dL Final    Calcium 11/03/2022 9.7  8.7 - 10.5 mg/dL Final    Total Protein 11/03/2022 7.7  6.0 - 8.4 g/dL Final    Albumin 11/03/2022 4.1  3.5 - 5.2 g/dL Final    Total Bilirubin 11/03/2022 0.2  0.1 - 1.0 mg/dL Final    Comment: For infants and newborns, interpretation of results should be based  on gestational age, weight and in agreement with clinical  observations.    Premature Infant recommended reference ranges:  Up to 24 hours.............<8.0 mg/dL  Up to 48 hours............<12.0 mg/dL  3-5 days..................<15.0 mg/dL  6-29 days.................<15.0 mg/dL      Alkaline Phosphatase 11/03/2022 124  55 - 135 U/L Final    AST 11/03/2022 18  10 - 40 U/L Final    ALT 11/03/2022 17  10 - 44 U/L Final    Anion Gap 11/03/2022 9  8 - 16 mmol/L Final    eGFR 11/03/2022 56.1 (A)  >60 mL/min/1.73 m^2 Final    Prothrombin Time 11/03/2022 10.2  9.0 - 12.5 sec Final    INR 11/03/2022 0.9  0.8 - 1.2 Final    Comment: Coumadin Therapy:  2.0 - 3.0 for INR for all indicators except mechanical heart valves  and antiphospholipid syndromes which should use 2.5 - 3.5.  LOT^040^PT Inn^[id number]     Admission on 10/10/2022, Discharged on 10/11/2022   Component Date Value Ref Range Status    WBC 10/10/2022 11.97  3.90 - 12.70 K/uL Final    RBC 10/10/2022 4.23  4.00  - 5.40 M/uL Final    Hemoglobin 10/10/2022 12.8  12.0 - 16.0 g/dL Final    Hematocrit 10/10/2022 38.2  37.0 - 48.5 % Final    MCV 10/10/2022 90  82 - 98 fL Final    MCH 10/10/2022 30.3  27.0 - 31.0 pg Final    MCHC 10/10/2022 33.5  32.0 - 36.0 g/dL Final    RDW 10/10/2022 13.6  11.5 - 14.5 % Final    Platelets 10/10/2022 212  150 - 450 K/uL Final    MPV 10/10/2022 11.1  9.2 - 12.9 fL Final    Immature Granulocytes 10/10/2022 0.3  0.0 - 0.5 % Final    Gran # (ANC) 10/10/2022 6.9  1.8 - 7.7 K/uL Final    Immature Grans (Abs) 10/10/2022 0.03  0.00 - 0.04 K/uL Final    Comment: Mild elevation in immature granulocytes is non specific and   can be seen in a variety of conditions including stress response,   acute inflammation, trauma and pregnancy. Correlation with other   laboratory and clinical findings is essential.      Lymph # 10/10/2022 3.7  1.0 - 4.8 K/uL Final    Mono # 10/10/2022 1.0  0.3 - 1.0 K/uL Final    Eos # 10/10/2022 0.3  0.0 - 0.5 K/uL Final    Baso # 10/10/2022 0.06  0.00 - 0.20 K/uL Final    nRBC 10/10/2022 0  0 /100 WBC Final    Gran % 10/10/2022 57.7  38.0 - 73.0 % Final    Lymph % 10/10/2022 30.9  18.0 - 48.0 % Final    Mono % 10/10/2022 8.3  4.0 - 15.0 % Final    Eosinophil % 10/10/2022 2.3  0.0 - 8.0 % Final    Basophil % 10/10/2022 0.5  0.0 - 1.9 % Final    Differential Method 10/10/2022 Automated   Final    Sodium 10/10/2022 140  136 - 145 mmol/L Final    Potassium 10/10/2022 3.9  3.5 - 5.1 mmol/L Final    Chloride 10/10/2022 107  95 - 110 mmol/L Final    CO2 10/10/2022 22 (L)  23 - 29 mmol/L Final    Glucose 10/10/2022 107  70 - 110 mg/dL Final    BUN 10/10/2022 14  8 - 23 mg/dL Final    Creatinine 10/10/2022 1.0  0.5 - 1.4 mg/dL Final    Calcium 10/10/2022 9.4  8.7 - 10.5 mg/dL Final    Total Protein 10/10/2022 7.7  6.0 - 8.4 g/dL Final    Albumin 10/10/2022 4.1  3.5 - 5.2 g/dL Final    Total Bilirubin 10/10/2022 0.2  0.1 - 1.0 mg/dL Final    Comment: For infants and newborns, interpretation  of results should be based  on gestational age, weight and in agreement with clinical  observations.    Premature Infant recommended reference ranges:  Up to 24 hours.............<8.0 mg/dL  Up to 48 hours............<12.0 mg/dL  3-5 days..................<15.0 mg/dL  6-29 days.................<15.0 mg/dL      Alkaline Phosphatase 10/10/2022 123  55 - 135 U/L Final    AST 10/10/2022 18  10 - 40 U/L Final    ALT 10/10/2022 16  10 - 44 U/L Final    Anion Gap 10/10/2022 11  8 - 16 mmol/L Final    eGFR 10/10/2022 >60.0  >60 mL/min/1.73 m^2 Final    Lipase 10/10/2022 69 (H)  4 - 60 U/L Final    Specimen UA 10/10/2022 Urine, Clean Catch   Final    Color, UA 10/10/2022 Colorless (A)  Yellow, Straw, Monica Final    Appearance, UA 10/10/2022 Clear  Clear Final    pH, UA 10/10/2022 6.0  5.0 - 8.0 Final    Specific Gravity, UA 10/10/2022 1.010  1.005 - 1.030 Final    Protein, UA 10/10/2022 Negative  Negative Final    Comment: Recommend a 24 hour urine protein or a urine   protein/creatinine ratio if globulin induced proteinuria is  clinically suspected.      Glucose, UA 10/10/2022 Negative  Negative Final    Ketones, UA 10/10/2022 Negative  Negative Final    Bilirubin (UA) 10/10/2022 Negative  Negative Final    Occult Blood UA 10/10/2022 Trace (A)  Negative Final    Nitrite, UA 10/10/2022 Negative  Negative Final    Urobilinogen, UA 10/10/2022 Negative  Negative EU/dL Final    Leukocytes, UA 10/10/2022 Negative  Negative Final   Admission on 10/03/2022, Discharged on 10/03/2022   Component Date Value Ref Range Status    Specimen UA 10/03/2022 Urine, Unspecified   Final    Color, UA 10/03/2022 Colorless (A)  Yellow, Straw, Monica Final    Appearance, UA 10/03/2022 Clear  Clear Final    pH, UA 10/03/2022 6.0  5.0 - 8.0 Final    Specific Gravity, UA 10/03/2022 1.010  1.005 - 1.030 Final    Protein, UA 10/03/2022 Negative  Negative Final    Comment: Recommend a 24 hour urine protein or a urine   protein/creatinine ratio if globulin  induced proteinuria is  clinically suspected.      Glucose, UA 10/03/2022 Negative  Negative Final    Ketones, UA 10/03/2022 Negative  Negative Final    Bilirubin (UA) 10/03/2022 Negative  Negative Final    Occult Blood UA 10/03/2022 Negative  Negative Final    Nitrite, UA 10/03/2022 Negative  Negative Final    Urobilinogen, UA 10/03/2022 Negative  Negative EU/dL Final    Leukocytes, UA 10/03/2022 Negative  Negative Final   Admission on 08/21/2022, Discharged on 08/21/2022   Component Date Value Ref Range Status    WBC 08/21/2022 12.03  3.90 - 12.70 K/uL Final    RBC 08/21/2022 4.37  4.00 - 5.40 M/uL Final    Hemoglobin 08/21/2022 13.1  12.0 - 16.0 g/dL Final    Hematocrit 08/21/2022 39.5  37.0 - 48.5 % Final    MCV 08/21/2022 90  82 - 98 fL Final    MCH 08/21/2022 30.0  27.0 - 31.0 pg Final    MCHC 08/21/2022 33.2  32.0 - 36.0 g/dL Final    RDW 08/21/2022 13.4  11.5 - 14.5 % Final    Platelets 08/21/2022 228  150 - 450 K/uL Final    MPV 08/21/2022 10.6  9.2 - 12.9 fL Final    Immature Granulocytes 08/21/2022 0.2  0.0 - 0.5 % Final    Gran # (ANC) 08/21/2022 6.7  1.8 - 7.7 K/uL Final    Immature Grans (Abs) 08/21/2022 0.03  0.00 - 0.04 K/uL Final    Comment: Mild elevation in immature granulocytes is non specific and   can be seen in a variety of conditions including stress response,   acute inflammation, trauma and pregnancy. Correlation with other   laboratory and clinical findings is essential.      Lymph # 08/21/2022 3.9  1.0 - 4.8 K/uL Final    Mono # 08/21/2022 1.0  0.3 - 1.0 K/uL Final    Eos # 08/21/2022 0.3  0.0 - 0.5 K/uL Final    Baso # 08/21/2022 0.06  0.00 - 0.20 K/uL Final    nRBC 08/21/2022 0  0 /100 WBC Final    Gran % 08/21/2022 55.9  38.0 - 73.0 % Final    Lymph % 08/21/2022 32.3  18.0 - 48.0 % Final    Mono % 08/21/2022 8.4  4.0 - 15.0 % Final    Eosinophil % 08/21/2022 2.7  0.0 - 8.0 % Final    Basophil % 08/21/2022 0.5  0.0 - 1.9 % Final    Differential Method 08/21/2022 Automated   Final     Sodium 08/21/2022 140  136 - 145 mmol/L Final    Potassium 08/21/2022 4.3  3.5 - 5.1 mmol/L Final    Chloride 08/21/2022 107  95 - 110 mmol/L Final    CO2 08/21/2022 21 (L)  23 - 29 mmol/L Final    Glucose 08/21/2022 108  70 - 110 mg/dL Final    BUN 08/21/2022 15  8 - 23 mg/dL Final    Creatinine 08/21/2022 1.0  0.5 - 1.4 mg/dL Final    Calcium 08/21/2022 9.5  8.7 - 10.5 mg/dL Final    Total Protein 08/21/2022 7.6  6.0 - 8.4 g/dL Final    Albumin 08/21/2022 3.8  3.5 - 5.2 g/dL Final    Total Bilirubin 08/21/2022 0.2  0.1 - 1.0 mg/dL Final    Comment: For infants and newborns, interpretation of results should be based  on gestational age, weight and in agreement with clinical  observations.    Premature Infant recommended reference ranges:  Up to 24 hours.............<8.0 mg/dL  Up to 48 hours............<12.0 mg/dL  3-5 days..................<15.0 mg/dL  6-29 days.................<15.0 mg/dL      Alkaline Phosphatase 08/21/2022 112  55 - 135 U/L Final    AST 08/21/2022 16  10 - 40 U/L Final    ALT 08/21/2022 16  10 - 44 U/L Final    Anion Gap 08/21/2022 12  8 - 16 mmol/L Final    eGFR 08/21/2022 >60.0  >60 mL/min/1.73 m^2 Final    Lipase 08/21/2022 81 (H)  4 - 60 U/L Final    Specimen UA 08/21/2022 Urine, Clean Catch   Final    Color, UA 08/21/2022 Yellow  Yellow, Straw, Monica Final    Appearance, UA 08/21/2022 Clear  Clear Final    pH, UA 08/21/2022 6.0  5.0 - 8.0 Final    Specific Gravity, UA 08/21/2022 1.020  1.005 - 1.030 Final    Protein, UA 08/21/2022 Negative  Negative Final    Comment: Recommend a 24 hour urine protein or a urine   protein/creatinine ratio if globulin induced proteinuria is  clinically suspected.      Glucose, UA 08/21/2022 Negative  Negative Final    Ketones, UA 08/21/2022 Negative  Negative Final    Bilirubin (UA) 08/21/2022 Negative  Negative Final    Occult Blood UA 08/21/2022 1+ (A)  Negative Final    Nitrite, UA 08/21/2022 Negative  Negative Final    Urobilinogen, UA 08/21/2022  Negative  Negative EU/dL Final    Leukocytes, UA 08/21/2022 Trace (A)  Negative Final    RBC, UA 08/21/2022 5 (H)  0 - 4 /hpf Final    WBC, UA 08/21/2022 8 (H)  0 - 5 /hpf Final    Bacteria 08/21/2022 Moderate (A)  None-Occ /hpf Final    Squam Epithel, UA 08/21/2022 3  /hpf Final    Microscopic Comment 08/21/2022 SEE COMMENT   Final    Comment: Other formed elements not mentioned in the report are not   present in the microscopic examination.          EKG  NSR PVCs    Echo   No results found for this or any previous visit.    Imaging  CT Abdomen Pelvis With Contrast    Result Date: 10/11/2022  EXAMINATION: CT ABDOMEN PELVIS WITH CONTRAST CLINICAL HISTORY: Abdominal pain, acute, nonlocalized;diffuse abd pain and distention, PMH diverticulitis with bowel perf.; TECHNIQUE: Axial CT images with sagittal and coronal reformats were obtained of the abdomen and pelvis from the hemidiaphragms through the symphysis pubis after the administration of 100mL Omnipaque 350. COMPARISON: 08/21/2022. FINDINGS: Lung Bases: Clear. Heart: Heart size is normal.  No pericardial effusion. Liver: The liver is normal in size and demonstrates homogeneous enhancement without focal lesion.  The portal vasculature is patent. Biliary tract: No intrahepatic or extrahepatic biliary ductal dilatation. Gallbladder: Surgically absent. Pancreas: Normal. No pancreatic ductal dilatation. Spleen: There is a calcified granuloma in the spleen.  The spleen is normal in size. Adrenals: Unremarkable. Kidneys and urinary collecting systems: Normal.  No hydronephrosis or urolithiasis. Lymph nodes: None enlarged. Stomach and bowel: The stomach is normal.  Loops of small and large bowel are normal in caliber without evidence for inflammation or obstruction.  The uterus is surgically absent. Peritoneum and mesentery: No ascites or free intraperitoneal air.  No abdominal fluid collection. Vasculature: There the severe atherosclerosis.  No aneurysm. Urinary bladder:  No wall thickening. Reproductive organs: The uterus is surgically absent. Body wall: No abnormality. Musculoskeletal: There are degenerative changes.     No acute abnormality of the abdomen and pelvis. Severe atherosclerosis. Electronically signed by: Jose Alberto Melendez Date:    10/11/2022 Time:    00:03    US Lower Extremity Veins Bilateral    Result Date: 11/3/2022  EXAMINATION: US LOWER EXTREMITY VEINS BILATERAL CLINICAL HISTORY: pain, swelling; TECHNIQUE: Duplex and color flow Doppler and dynamic compression was performed of the bilateral lower extremity veins was performed. COMPARISON: None FINDINGS: Right thigh veins: The common femoral, femoral, popliteal, upper greater saphenous, and deep femoral veins are patent and free of thrombus. The veins are normally compressible and have normal phasic flow and augmentation response. Right calf veins: The visualized calf veins are patent. Left thigh veins: The common femoral, femoral, popliteal, upper greater saphenous, and deep femoral veins are patent and free of thrombus. The veins are normally compressible and have normal phasic flow and augmentation response. Left calf veins: The visualized calf veins are patent. Miscellaneous: None     No evidence of deep venous thrombosis in either lower extremity. Electronically signed by: Crescencio May Date:    11/03/2022 Time:    16:18    Mammo Digital Screening Bilat w/ Vladislav    Result Date: 10/27/2022  Result: Mammo Digital Screening Bilat w/ Vladislav History: Patient is 62 y.o. and is seen for a screening mammogram. Films Compared: Compared to: 06/04/2019 Mammo Digital Screening Bilat without CA, 05/21/2018 Mammo Digital Screening Bilat without CA, and 03/03/2017 Mammo Digital Screening Bilat without CA Findings:  This procedure was performed using tomosynthesis. Computer-aided detection was utilized in the interpretation of this examination. The breasts are heterogeneously dense, which may obscure small masses. There is no  evidence of suspicious masses, microcalcifications or architectural distortion.      No mammographic evidence of malignancy. BI-RADS Category 1: Negative Recommendation: Routine screening mammogram in 1 year is recommended. Your estimated lifetime risk of breast cancer (to age 85) based on Tyrer-Cuzick risk assessment model is 5.44 %.  According to the American Cancer Society, patients with a lifetime breast cancer risk of 20% or higher might benefit from supplemental screening tests. ??       Prior coronary angiogram / intervention:  None known    Assessment and Plan  1. PAOD (peripheral arterial occlusive disease)  - Radiology US Lower Extremity Arteries Bilateral; Future  Continue aspirin and high-dose statin    2. Edema, unspecified type  - Echo; Future    3. Venous insufficiency (chronic) (peripheral)  - Ambulatory referral/consult to Podiatry; Future    4. Essential hypertension  Continue ACEi BB controlled  - IN OFFICE EKG 12-LEAD (to Muse)    5. Pain in both feet  Podiatry and LE arterial duplex  Sounds like neuropathic pain    6. PVC (premature ventricular contraction)  48 hour Holter to quantify  Noted on EKG        Follow Up  6 months      Tyler Pete MD, FACC, RPVI  Interventional Cardiology

## 2022-12-08 DIAGNOSIS — I10 ESSENTIAL HYPERTENSION: Primary | ICD-10-CM

## 2022-12-08 RX ORDER — LISINOPRIL 20 MG/1
20 TABLET ORAL DAILY
Qty: 90 TABLET | Refills: 1 | Status: ON HOLD | OUTPATIENT
Start: 2022-12-08 | End: 2023-06-13 | Stop reason: HOSPADM

## 2022-12-08 NOTE — TELEPHONE ENCOUNTER
----- Message from Shabnam Parr sent at 12/8/2022  3:28 PM CST -----  Fiona Hernandez calling regarding Refills for lisinopriL (PRINIVIL,ZESTRIL) 20 MG tablet call back if needed [phone]      [facility] - [place] - [address] [place] [id number]  Phone: [phone] Fax: [phone]

## 2022-12-09 ENCOUNTER — PATIENT OUTREACH (OUTPATIENT)
Dept: EMERGENCY MEDICINE | Facility: HOSPITAL | Age: 62
End: 2022-12-09
Payer: MEDICAID

## 2022-12-29 ENCOUNTER — PATIENT OUTREACH (OUTPATIENT)
Dept: EMERGENCY MEDICINE | Facility: HOSPITAL | Age: 62
End: 2022-12-29
Payer: MEDICAID

## 2023-01-18 ENCOUNTER — TELEPHONE (OUTPATIENT)
Dept: CARDIOLOGY | Facility: CLINIC | Age: 63
End: 2023-01-18
Payer: MEDICARE

## 2023-01-18 NOTE — TELEPHONE ENCOUNTER
Patient cancelled nuclear stress test    Spoke with patient, she would like to schedule an appointment with provider to discuss nuclear stress procedure before rescheduling.  Appointment scheduled.

## 2023-01-18 NOTE — TELEPHONE ENCOUNTER
----- Message from Ani Frank sent at 1/18/2023  9:14 AM CST -----  Type:  Needs Medical Advice    Who Called: Pt  Would the patient rather a call back or a response via [facility]?  Call  Best Call Back Number:  [phone]  Additional Information:  Pt has a stress test today 1/18/23 but Pt wants to see Dr. Pete before taking the test.  Pt stated that she had a bad experience three years ago and does not want that to be repeated.  Pt would like a call back she has further questions.

## 2023-01-27 ENCOUNTER — NURSE TRIAGE (OUTPATIENT)
Dept: ADMINISTRATIVE | Facility: CLINIC | Age: 63
End: 2023-01-27
Payer: MEDICARE

## 2023-01-27 ENCOUNTER — PATIENT OUTREACH (OUTPATIENT)
Dept: EMERGENCY MEDICINE | Facility: HOSPITAL | Age: 63
End: 2023-01-27
Payer: MEDICARE

## 2023-01-27 NOTE — TELEPHONE ENCOUNTER
Pt states had a missed call from someone with [facility] that was helping her since her  passed away, pt does not know which department or have a number. Per chart, Nando Carlton, attempted to contact pt. Able to transfer pt directly to [facility]. Advised pt to call back for any further questions or concerns.     Reason for Disposition   Information only question and nurse able to answer    Protocols used: Information Only Call - No Triage-A-OH

## 2023-02-15 PROBLEM — J44.9 COPD, MODERATE: Status: ACTIVE | Noted: 2023-02-15

## 2023-02-24 ENCOUNTER — PATIENT OUTREACH (OUTPATIENT)
Dept: EMERGENCY MEDICINE | Facility: HOSPITAL | Age: 63
End: 2023-02-24
Payer: MEDICARE

## 2023-03-17 ENCOUNTER — TELEPHONE (OUTPATIENT)
Dept: CARDIOLOGY | Facility: CLINIC | Age: 63
End: 2023-03-17
Payer: MEDICARE

## 2023-03-17 NOTE — TELEPHONE ENCOUNTER
JONH ONLY    Spoke with Yara at [facility] scheduling.  Informed that patient has canceled the nuclear stress test on 12/29/2022, 01/06/2023,01/18/2023, and 02/13/2023 as well as No Showed on 02/20/2023 and 03/16/2023.    The department has stated that if patient reschedules and does not show up, she will be charged for the medication.  Office verbalized understanding, message sent to provider.

## 2023-04-03 ENCOUNTER — TELEPHONE (OUTPATIENT)
Dept: CARDIOLOGY | Facility: CLINIC | Age: 63
End: 2023-04-03
Payer: MEDICARE

## 2023-04-03 NOTE — TELEPHONE ENCOUNTER
Patient called about her legs swelling and chest pain when she bends over. I informed her to go to the ED and she staated that she may, gave her an appt for 4/4/2023.        - Message from Sana Layton sent at 4/3/2023 10:52 AM CDT -----  Regarding: Schedule Appt  Pt calling to schedule an appointment, ASAP, please advise.        [phone]

## 2023-04-12 ENCOUNTER — PATIENT OUTREACH (OUTPATIENT)
Dept: EMERGENCY MEDICINE | Facility: HOSPITAL | Age: 63
End: 2023-04-12
Payer: MEDICARE

## 2023-04-14 ENCOUNTER — TELEPHONE (OUTPATIENT)
Dept: GASTROENTEROLOGY | Facility: CLINIC | Age: 63
End: 2023-04-14
Payer: MEDICARE

## 2023-04-14 NOTE — TELEPHONE ENCOUNTER
Gave patient appt for 4/19/2023    ----- Message from Kath Ferrararoe sent at 4/14/2023  9:30 AM CDT -----  Type:  Sooner Apoointment Request    Caller is requesting a sooner appointment.  Caller declined first available appointment listed below.  Caller will not accept being placed on the waitlist and is requesting a message be sent to doctor.  Name of Caller: PT  When is the first available appointment? 6/26/23  Symptoms:Colon cancer screening [Z12.11]  Would the patient rather a call back or a response via [facility]? CALL  Best Call Back Number:[phone]  Additional Information: HAS A REFERRAL

## 2023-04-26 ENCOUNTER — TELEPHONE (OUTPATIENT)
Dept: CARDIOLOGY | Facility: CLINIC | Age: 63
End: 2023-04-26
Payer: MEDICARE

## 2023-04-26 NOTE — TELEPHONE ENCOUNTER
JONH ONLY    Spoke with Yara at [facility] scheduling department.   Informed that patient has canceled the nuclear stress test on 04/26/2023, 12/29/2022, 01/06/2023,01/18/2023, and 02/13/2023 as well as No Showed on 02/20/2023 and 03/16/2023.    The director of the department states patient Will Not be rescheduled due to repeated cancellations and no shows.  Office verbalized understanding, message sent to provider.     Order has been cancelled/closed.

## 2023-04-28 ENCOUNTER — PATIENT OUTREACH (OUTPATIENT)
Dept: EMERGENCY MEDICINE | Facility: HOSPITAL | Age: 63
End: 2023-04-28
Payer: MEDICARE

## 2023-05-01 ENCOUNTER — PATIENT OUTREACH (OUTPATIENT)
Dept: EMERGENCY MEDICINE | Facility: HOSPITAL | Age: 63
End: 2023-05-01
Payer: MEDICARE

## 2023-05-01 ENCOUNTER — TELEPHONE (OUTPATIENT)
Dept: CARDIOLOGY | Facility: CLINIC | Age: 63
End: 2023-05-01
Payer: MEDICARE

## 2023-05-01 NOTE — TELEPHONE ENCOUNTER
LOV 11/09/2023    Spoke with patient, informed that we cannot reschedule her missed MPI per  at [facility] due to habitual missed or no showed appointments (7).      Patient has asked if she can do an exercise stress?

## 2023-05-01 NOTE — TELEPHONE ENCOUNTER
----- Message from Rhea Jerome sent at 5/1/2023 10:05 AM CDT -----  Contact: [phone]  Fiona Hernandez calling regarding Orders (message) for #NM Myocardial Perfusion Spect Multi Pharmacologic

## 2023-05-09 ENCOUNTER — TELEPHONE (OUTPATIENT)
Dept: GASTROENTEROLOGY | Facility: CLINIC | Age: 63
End: 2023-05-09
Payer: MEDICARE

## 2023-05-09 NOTE — TELEPHONE ENCOUNTER
-This patient in not duwe for a colonoscopy till 2028 but the patient stated that she can not have it done because of the previous procedure. She would have to do a barium with pictures because she has staples from repairs in her colon and rectum.       ---- Message from Jay Kruger MA sent at 5/9/2023  2:33 PM CDT -----  Regarding: FW: Appt Request    ----- Message -----  From: Agustina Miller  Sent: 5/9/2023   2:32 PM CDT  To: [facility] Hernandez Staff  Subject: Appt Request                                     Pt called about scheduling a :Colon cancer screening, pt states she has not had a check up in 3 years and also has questions about procedure because she can not have scopes she states.    Please call back to further assist-[phone]

## 2023-05-12 ENCOUNTER — PATIENT OUTREACH (OUTPATIENT)
Dept: EMERGENCY MEDICINE | Facility: HOSPITAL | Age: 63
End: 2023-05-12
Payer: MEDICARE

## 2023-05-16 PROBLEM — S42.401A CLOSED FRACTURE OF RIGHT ELBOW: Status: ACTIVE | Noted: 2023-05-16

## 2023-05-17 ENCOUNTER — PATIENT OUTREACH (OUTPATIENT)
Dept: EMERGENCY MEDICINE | Facility: HOSPITAL | Age: 63
End: 2023-05-17
Payer: MEDICARE

## 2023-05-19 NOTE — TELEPHONE ENCOUNTER
LVM for patient, to schedule your exercise stress test at [facility] [place].  You may call me back is [phone] or [place] scheduling department at [phone].

## 2023-05-19 NOTE — TELEPHONE ENCOUNTER
Spoke with patient, scheduled exercise stress test for 05/31/2023.  Patient verbalized understanding.

## 2023-05-19 NOTE — TELEPHONE ENCOUNTER
Spoke with patient, informed of provider recommendation.  Patient verbalized understanding.  She is currently at [facility] graduation, will call back to schedule.

## 2023-05-23 ENCOUNTER — PATIENT OUTREACH (OUTPATIENT)
Dept: EMERGENCY MEDICINE | Facility: HOSPITAL | Age: 63
End: 2023-05-23
Payer: MEDICARE

## 2023-06-12 PROBLEM — R07.9 CHEST PAIN: Status: ACTIVE | Noted: 2023-06-12

## 2023-06-12 PROBLEM — I73.9 PAD (PERIPHERAL ARTERY DISEASE): Status: ACTIVE | Noted: 2023-06-12

## 2023-06-12 PROBLEM — I49.3 FREQUENT PVCS: Status: ACTIVE | Noted: 2023-06-12

## 2023-06-12 PROBLEM — R07.2 PRECORDIAL PAIN: Status: ACTIVE | Noted: 2023-06-12

## 2023-06-12 PROBLEM — I10 HYPERTENSION: Status: ACTIVE | Noted: 2023-06-12

## 2023-06-12 PROBLEM — E78.5 HYPERLIPIDEMIA: Status: ACTIVE | Noted: 2023-06-12

## 2023-06-13 ENCOUNTER — PATIENT OUTREACH (OUTPATIENT)
Dept: EMERGENCY MEDICINE | Facility: HOSPITAL | Age: 63
End: 2023-06-13
Payer: MEDICARE

## 2023-06-13 PROBLEM — R07.9 CHEST PAIN: Status: RESOLVED | Noted: 2023-06-12 | Resolved: 2023-06-13

## 2023-06-14 ENCOUNTER — PATIENT OUTREACH (OUTPATIENT)
Dept: EMERGENCY MEDICINE | Facility: HOSPITAL | Age: 63
End: 2023-06-14
Payer: MEDICARE

## 2023-06-20 ENCOUNTER — OFFICE VISIT (OUTPATIENT)
Dept: CARDIOLOGY | Facility: CLINIC | Age: 63
End: 2023-06-20
Payer: MEDICARE

## 2023-06-20 VITALS
BODY MASS INDEX: 31.99 KG/M2 | HEIGHT: 60 IN | SYSTOLIC BLOOD PRESSURE: 136 MMHG | HEART RATE: 74 BPM | OXYGEN SATURATION: 97 % | WEIGHT: 162.94 LBS | DIASTOLIC BLOOD PRESSURE: 63 MMHG

## 2023-06-20 DIAGNOSIS — F17.200 TOBACCO DEPENDENCE: Primary | ICD-10-CM

## 2023-06-20 DIAGNOSIS — Z82.49 FH: CAD (CORONARY ARTERY DISEASE): ICD-10-CM

## 2023-06-20 DIAGNOSIS — I10 ESSENTIAL HYPERTENSION: ICD-10-CM

## 2023-06-20 PROCEDURE — 99999 PR PBB SHADOW E&M-EST. PATIENT-LVL IV: ICD-10-PCS | Mod: PBBFAC,,, | Performed by: NURSE PRACTITIONER

## 2023-06-20 PROCEDURE — 1159F PR MEDICATION LIST DOCUMENTED IN MEDICAL RECORD: ICD-10-PCS | Mod: CPTII,S$GLB,, | Performed by: NURSE PRACTITIONER

## 2023-06-20 PROCEDURE — 99999 PR PBB SHADOW E&M-EST. PATIENT-LVL IV: CPT | Mod: PBBFAC,,, | Performed by: NURSE PRACTITIONER

## 2023-06-20 PROCEDURE — 1160F RVW MEDS BY RX/DR IN RCRD: CPT | Mod: CPTII,S$GLB,, | Performed by: NURSE PRACTITIONER

## 2023-06-20 PROCEDURE — 1160F PR REVIEW ALL MEDS BY PRESCRIBER/CLIN PHARMACIST DOCUMENTED: ICD-10-PCS | Mod: CPTII,S$GLB,, | Performed by: NURSE PRACTITIONER

## 2023-06-20 PROCEDURE — 99212 PR OFFICE/OUTPT VISIT, EST, LEVL II, 10-19 MIN: ICD-10-PCS | Mod: S$GLB,,, | Performed by: NURSE PRACTITIONER

## 2023-06-20 PROCEDURE — 3075F SYST BP GE 130 - 139MM HG: CPT | Mod: CPTII,S$GLB,, | Performed by: NURSE PRACTITIONER

## 2023-06-20 PROCEDURE — 3288F PR FALLS RISK ASSESSMENT DOCUMENTED: ICD-10-PCS | Mod: CPTII,S$GLB,, | Performed by: NURSE PRACTITIONER

## 2023-06-20 PROCEDURE — 3008F PR BODY MASS INDEX (BMI) DOCUMENTED: ICD-10-PCS | Mod: CPTII,S$GLB,, | Performed by: NURSE PRACTITIONER

## 2023-06-20 PROCEDURE — 3288F FALL RISK ASSESSMENT DOCD: CPT | Mod: CPTII,S$GLB,, | Performed by: NURSE PRACTITIONER

## 2023-06-20 PROCEDURE — 1111F PR DISCHARGE MEDS RECONCILED W/ CURRENT OUTPATIENT MED LIST: ICD-10-PCS | Mod: CPTII,S$GLB,, | Performed by: NURSE PRACTITIONER

## 2023-06-20 PROCEDURE — 1111F DSCHRG MED/CURRENT MED MERGE: CPT | Mod: CPTII,S$GLB,, | Performed by: NURSE PRACTITIONER

## 2023-06-20 PROCEDURE — 3075F PR MOST RECENT SYSTOLIC BLOOD PRESS GE 130-139MM HG: ICD-10-PCS | Mod: CPTII,S$GLB,, | Performed by: NURSE PRACTITIONER

## 2023-06-20 PROCEDURE — 3078F PR MOST RECENT DIASTOLIC BLOOD PRESSURE < 80 MM HG: ICD-10-PCS | Mod: CPTII,S$GLB,, | Performed by: NURSE PRACTITIONER

## 2023-06-20 PROCEDURE — 1159F MED LIST DOCD IN RCRD: CPT | Mod: CPTII,S$GLB,, | Performed by: NURSE PRACTITIONER

## 2023-06-20 PROCEDURE — 99212 OFFICE O/P EST SF 10 MIN: CPT | Mod: S$GLB,,, | Performed by: NURSE PRACTITIONER

## 2023-06-20 PROCEDURE — 1100F PTFALLS ASSESS-DOCD GE2>/YR: CPT | Mod: CPTII,S$GLB,, | Performed by: NURSE PRACTITIONER

## 2023-06-20 PROCEDURE — 3078F DIAST BP <80 MM HG: CPT | Mod: CPTII,S$GLB,, | Performed by: NURSE PRACTITIONER

## 2023-06-20 PROCEDURE — 3008F BODY MASS INDEX DOCD: CPT | Mod: CPTII,S$GLB,, | Performed by: NURSE PRACTITIONER

## 2023-06-20 PROCEDURE — 1100F PR PT FALLS ASSESS DOC 2+ FALLS/FALL W/INJURY/YR: ICD-10-PCS | Mod: CPTII,S$GLB,, | Performed by: NURSE PRACTITIONER

## 2023-06-20 NOTE — PROGRESS NOTES
Cardiology    6/20/2023  9:35 AM    Problem list  Patient Active Problem List   Diagnosis    Colon cancer screening    Essential hypertension    Venous insufficiency (chronic) (peripheral)    Hypercholesteremia    Tobacco dependence    Generalized anxiety disorder    Bilateral leg swelling    PAOD (peripheral arterial occlusive disease)    Skin infection, bacterial    Dyspnea on exertion    FH: CAD (coronary artery disease)    Pain in both lower extremities    Nocturnal leg cramps    Bilateral leg edema    Edema    Foot pain    PVC (premature ventricular contraction)    COPD, moderate    Closed fracture of right elbow    Precordial pain    Frequent PVCs    PAD (peripheral artery disease)       CC:  Establish care    HPI:  Was able to see ortho, still having pain 24/7.  Having significant stress with the first anniversary of her 's death approaching. No chest pain, no SoB. It is hard to get the nicotine patches and her insurance does not cover them.     Medications  Current Outpatient Medications   Medication Sig Dispense Refill    amLODIPine (NORVASC) 5 MG tablet Take 5 mg by mouth once daily.      aspirin (ECOTRIN) 81 MG EC tablet Take 81 mg by mouth once daily.      atorvastatin (LIPITOR) 80 MG tablet Take 80 mg by mouth once daily.      carvediloL (COREG) 6.25 MG tablet Take 1 tablet (6.25 mg total) by mouth 2 (two) times daily with meals. 60 tablet 3    diazePAM (VALIUM) 10 MG Tab Take 10 mg by mouth 2 (two) times daily as needed.      ergocalciferol (ERGOCALCIFEROL) 50,000 unit Cap Take 50,000 Units by mouth every 7 days.      fluticasone propionate (FLONASE) 50 mcg/actuation nasal spray 1 spray by Each Nostril route once daily.      gabapentin (NEURONTIN) 300 MG capsule Take 300 mg by mouth 2 (two) times daily.      ibuprofen (ADVIL,MOTRIN) 600 MG tablet Take 1 tablet (600 mg total) by mouth every 6 (six) hours as needed for Pain. 20 tablet 0    loratadine (CLARITIN) 10 mg tablet Take 1 tablet  (10 mg total) by mouth once daily. 30 tablet 0    pantoprazole (PROTONIX) 40 MG tablet Take 1 tablet (40 mg total) by mouth once daily. 30 tablet 3    HYDROcodone-acetaminophen (NORCO) 5-325 mg per tablet Take 1 tablet by mouth every 4 (four) hours as needed for Pain. (Patient not taking: Reported on 6/20/2023) 10 tablet 0     No current facility-administered medications for this visit.      Prior to Admission medications    Medication Sig Start Date End Date Taking? Authorizing Provider   amLODIPine (NORVASC) 5 MG tablet Take 5 mg by mouth once daily.   Yes Historical Provider   aspirin (ECOTRIN) 81 MG EC tablet Take 81 mg by mouth once daily.   Yes Historical Provider   atorvastatin (LIPITOR) 80 MG tablet Take 80 mg by mouth once daily.   Yes Historical Provider   carvediloL (COREG) 6.25 MG tablet Take 1 tablet (6.25 mg total) by mouth 2 (two) times daily with meals. 4/22/22  Yes Sean Early MD   diazePAM (VALIUM) 10 MG Tab Take 10 mg by mouth 2 (two) times daily as needed.   Yes Historical Provider   ergocalciferol (ERGOCALCIFEROL) 50,000 unit Cap Take 50,000 Units by mouth every 7 days.   Yes Historical Provider   fluticasone propionate (FLONASE) 50 mcg/actuation nasal spray 1 spray by Each Nostril route once daily.   Yes Historical Provider   gabapentin (NEURONTIN) 300 MG capsule Take 300 mg by mouth 2 (two) times daily.   Yes Historical Provider   ibuprofen (ADVIL,MOTRIN) 600 MG tablet Take 1 tablet (600 mg total) by mouth every 6 (six) hours as needed for Pain. 5/15/23  Yes Jorge Joshua NP   loratadine (CLARITIN) 10 mg tablet Take 1 tablet (10 mg total) by mouth once daily. 10/6/20 6/20/23 Yes Adilene Johns DNP   pantoprazole (PROTONIX) 40 MG tablet Take 1 tablet (40 mg total) by mouth once daily. 4/20/22  Yes Sean Early MD   HYDROcodone-acetaminophen (NORCO) 5-325 mg per tablet Take 1 tablet by mouth every 4 (four) hours as needed for Pain.  Patient not taking: Reported on 6/20/2023 1/9/23    Bautista Tubbs MD         History  Past Medical History:   Diagnosis Date    Back pain     Bowel perforation 11/06/2018    Diverticulitis     Hyperlipidemia     Hypertension     Peritoneal cavity free air 11/06/2018     Past Surgical History:   Procedure Laterality Date    APPENDECTOMY  2017    CHOLECYSTECTOMY      COLONOSCOPY  11/06/2018    COLONOSCOPY N/A 11/6/2018    Procedure: COLONOSCOPY;  Surgeon: Cash Little MD;  Location: [facility] ENDO;  Service: General;  Laterality: N/A;    CORONARY ANGIOPLASTY WITH STENT PLACEMENT  2016    1 stent     DIAGNOSTIC LAPAROSCOPY N/A 11/7/2018    Procedure: LAPAROSCOPY, DIAGNOSTIC;  Surgeon: Cedric Mcknight MD;  Location: [facility] OR;  Service: General;  Laterality: N/A;    HYSTERECTOMY       Social History     Socioeconomic History    Marital status:    Tobacco Use    Smoking status: Some Days     Packs/day: 0.50     Years: 20.00     Pack years: 10.00     Types: Cigarettes    Smokeless tobacco: Never   Substance and Sexual Activity    Alcohol use: Never    Drug use: Not Currently    Sexual activity: Not Currently     Social Determinants of Health     Financial Resource Strain: Medium Risk    Difficulty of Paying Living Expenses: Somewhat hard   Food Insecurity: No Food Insecurity    Worried About Running Out of Food in the Last Year: Never true    Ran Out of Food in the Last Year: Never true   Transportation Needs: No Transportation Needs    Lack of Transportation (Medical): No    Lack of Transportation (Non-Medical): No   Physical Activity: Inactive    Days of Exercise per Week: 0 days    Minutes of Exercise per Session: 0 min   Stress: Stress Concern Present    Feeling of Stress : To some extent   Social Connections: Moderately Isolated    Frequency of Communication with Friends and Family: Never    Frequency of Social Gatherings with Friends and Family: Never    Attends Congregation Services: More than 4 times per year    Active Member of Clubs or  Organizations: Yes    Attends Club or Organization Meetings: Never    Marital Status:    Housing Stability: Low Risk     Unable to Pay for Housing in the Last Year: No    Number of Places Lived in the Last Year: 1    Unstable Housing in the Last Year: No         Allergies  Review of patient's allergies indicates:   Allergen Reactions    Sulfa (sulfonamide antibiotics) Shortness Of Breath         Review of Systems   Review of Systems   Constitutional: Negative for diaphoresis and malaise/fatigue.   HENT: Negative.     Cardiovascular:  Negative for chest pain, claudication, dyspnea on exertion, irregular heartbeat, leg swelling, near-syncope, orthopnea, palpitations, paroxysmal nocturnal dyspnea and syncope.   Respiratory:  Negative for shortness of breath.    Endocrine: Negative for polydipsia, polyphagia and polyuria.   Hematologic/Lymphatic: Does not bruise/bleed easily.   Gastrointestinal:  Negative for bloating, nausea and vomiting.   Genitourinary: Negative.    Neurological:  Negative for excessive daytime sleepiness, dizziness, light-headedness, loss of balance and weakness.   Psychiatric/Behavioral:  Positive for depression. The patient is nervous/anxious.    Allergic/Immunologic: Negative.        Physical Exam  Wt Readings from Last 1 Encounters:   06/20/23 73.9 kg (162 lb 14.7 oz)     BP Readings from Last 3 Encounters:   06/20/23 136/63   06/15/23 119/61   06/13/23 136/61     Pulse Readings from Last 1 Encounters:   06/20/23 74     Body mass index is 31.82 kg/m².    Physical Exam  Vitals and nursing note reviewed.   Constitutional:       Appearance: Normal appearance.   HENT:      Head: Normocephalic and atraumatic.      Mouth/Throat:      Mouth: Mucous membranes are moist.   Eyes:      Pupils: Pupils are equal, round, and reactive to light.   Cardiovascular:      Rate and Rhythm: Normal rate and regular rhythm.      Pulses:           Radial pulses are 2+ on the right side and 2+ on the left side.         Dorsalis pedis pulses are 2+ on the right side and 2+ on the left side.        Posterior tibial pulses are 2+ on the right side and 2+ on the left side.      Heart sounds: No murmur heard.  Pulmonary:      Effort: Pulmonary effort is normal. No respiratory distress.      Breath sounds: Wheezing present.   Abdominal:      General: There is no distension.      Tenderness: There is no abdominal tenderness.   Musculoskeletal:      Cervical back: Normal range of motion.      Right lower leg: Edema (trace) present.      Left lower leg: Edema (trace) present.   Skin:     General: Skin is warm and dry.      Findings: No erythema.   Neurological:      General: No focal deficit present.      Mental Status: She is alert.   Psychiatric:         Mood and Affect: Mood normal.         Behavior: Behavior normal.      Comments: tearful         Problem List Items Addressed This Visit          Cardiac/Vascular    Essential hypertension    Overview     Continue medications as now         Current Assessment & Plan     As above         FH: CAD (coronary artery disease)    Overview     Recent nuclear negative         Current Assessment & Plan     As above            Other    Tobacco dependence - Primary    Relevant Orders    Ambulatory referral/consult to Smoking Cessation Program           Follow Up  3 months      @Rahel Gagnon DNP

## 2023-06-20 NOTE — PATIENT INSTRUCTIONS
Continue your current medications    I have referred you to smoking cessation     We will see you in 3 months, sooner if you need us    I recommend a low sodium diet

## 2023-06-21 ENCOUNTER — PATIENT OUTREACH (OUTPATIENT)
Dept: EMERGENCY MEDICINE | Facility: HOSPITAL | Age: 63
End: 2023-06-21
Payer: MEDICARE

## 2023-06-22 ENCOUNTER — PATIENT OUTREACH (OUTPATIENT)
Dept: EMERGENCY MEDICINE | Facility: HOSPITAL | Age: 63
End: 2023-06-22
Payer: MEDICARE

## 2023-07-05 ENCOUNTER — PATIENT OUTREACH (OUTPATIENT)
Dept: EMERGENCY MEDICINE | Facility: HOSPITAL | Age: 63
End: 2023-07-05
Payer: MEDICARE

## 2023-08-08 ENCOUNTER — PATIENT OUTREACH (OUTPATIENT)
Dept: EMERGENCY MEDICINE | Facility: HOSPITAL | Age: 63
End: 2023-08-08
Payer: MEDICARE

## 2023-08-23 ENCOUNTER — TELEPHONE (OUTPATIENT)
Dept: GASTROENTEROLOGY | Facility: CLINIC | Age: 63
End: 2023-08-23
Payer: MEDICARE

## 2023-08-23 NOTE — TELEPHONE ENCOUNTER
The patient is not do for a colon check up till 2028.      ----- Message from Hilda Guerrero sent at 8/23/2023 12:03 PM CDT -----  Contact: pt  Pt requesting call back RE: pt states 4 years ago she had a colonoscopy for years ago and holes were made in her colon and rectum. She states she is in need of a colonoscopy and is unsure who she is schedule with since her case is complicated and she can not have a normal colonoscopy . Please call       Confirmed contact below:  Contact Name:Fiona David  Phone Number: [phone]

## 2023-08-30 ENCOUNTER — PATIENT OUTREACH (OUTPATIENT)
Dept: EMERGENCY MEDICINE | Facility: HOSPITAL | Age: 63
End: 2023-08-30
Payer: MEDICARE

## 2023-08-30 NOTE — PROGRESS NOTES
ED Navigator spoke with with patient for follow-up. Patient had a recent ED visit. Patient stated she is doing fine. Patient declined the need for resources/assistance at this time.  Isi Carlton

## 2023-09-01 ENCOUNTER — TELEPHONE (OUTPATIENT)
Dept: PAIN MEDICINE | Facility: CLINIC | Age: 63
End: 2023-09-01
Payer: MEDICAID

## 2023-09-01 NOTE — TELEPHONE ENCOUNTER
Spoke with patient. She was informed we would not be able to see her due to her insurance coverage is out of network with [facility]. Patient verbalized understanding. No further issues discussed.

## 2023-09-01 NOTE — TELEPHONE ENCOUNTER
----- Message from Bam Jones LPN sent at 8/23/2023  4:50 PM CDT -----  Regarding: FW: appt request  Contact: pt    ----- Message -----  From: Hilda Guerrero  Sent: 8/23/2023   4:22 PM CDT  To: Aniyah Kinsey [facility] Staff  Subject: appt request                                     Pt requesting call back RE: would like to schedule appt for back inderjit, nothing available in epic    Confirmed contact below:  Contact Name:Fiona David  Phone Number: [phone]

## 2023-09-13 ENCOUNTER — TELEPHONE (OUTPATIENT)
Dept: CARDIOLOGY | Facility: CLINIC | Age: 63
End: 2023-09-13

## 2023-09-13 PROBLEM — R55 NEAR SYNCOPE: Status: ACTIVE | Noted: 2023-09-13

## 2023-09-13 PROBLEM — R42 VERTIGO: Status: ACTIVE | Noted: 2023-09-13

## 2023-09-13 NOTE — TELEPHONE ENCOUNTER
----- Message from Elsie Tilley sent at 9/13/2023 10:24 AM CDT -----  Regarding: Urgent for pt/ Call back, pt unclear about why there is appt today- not next week  Contact: @[phone]  Regarding: Call back pt, thought appt was next week, got a text for today.    Contact: Laly    Type:Call back, appt text for today. Bed spinning, walking crooked and dizzy, blacking out, ambulance recently here- possible vertigo. Blood sugar and  BP are fine, not taken to hospital, told to inform  Unable to drive.    Who Called: Laly    Would the patient rather a call back or a response via [facility]? Phone call    Best Call Back Number: @[phone]      Additional Information:

## 2023-09-13 NOTE — TELEPHONE ENCOUNTER
Spoke with patient, paramedics came to her house this morning due to dizziness and blacking out,  did not go to hospital.  Advised patient to go to ER for evaluation, advised to 911 if she does not have a ride.  Patient states she has a ride and will go to ER.

## 2023-09-20 ENCOUNTER — PATIENT OUTREACH (OUTPATIENT)
Dept: EMERGENCY MEDICINE | Facility: HOSPITAL | Age: 63
End: 2023-09-20
Payer: MEDICAID

## 2023-09-20 ENCOUNTER — TELEPHONE (OUTPATIENT)
Dept: CARDIOLOGY | Facility: CLINIC | Age: 63
End: 2023-09-20
Payer: MEDICAID

## 2023-09-20 NOTE — PROGRESS NOTES
ED Navigator phoned patient for follow-up. Patient had a recent ED visit. Patient stated she is doing ok. Patient had follow-up with Cardiologist and is scheduled for testing. Patient is currently staying at her daughter house for support. With patients permission, ED Navigation gave new utility assistance resource. As always patient appreciative for the follow-up call.  Isi Carlton

## 2023-09-20 NOTE — TELEPHONE ENCOUNTER
Spoke with patient took Meclizine 10 minutes ago for vertigo,  rescheduled cardiology appointment with NP Rahel Gagnon on 10/02/23 @ 11:30 AM.

## 2023-09-20 NOTE — TELEPHONE ENCOUNTER
----- Message from Hilda Guerrero sent at 9/20/2023 10:18 AM CDT -----  Regarding: appt reschedule  Contact: pt  Pt requesting call back RE: would like to reschedule appt for later date, she states she is having a vertigo spell. Nothing available in epic    Confirmed contact below:  Contact Name:Fiona Hernandez  Phone Number: [phone]

## 2023-10-02 ENCOUNTER — OFFICE VISIT (OUTPATIENT)
Dept: CARDIOLOGY | Facility: CLINIC | Age: 63
End: 2023-10-02
Payer: MEDICARE

## 2023-10-02 VITALS
BODY MASS INDEX: 32.76 KG/M2 | HEART RATE: 81 BPM | DIASTOLIC BLOOD PRESSURE: 74 MMHG | SYSTOLIC BLOOD PRESSURE: 164 MMHG | WEIGHT: 166.88 LBS | HEIGHT: 60 IN | OXYGEN SATURATION: 97 %

## 2023-10-02 DIAGNOSIS — F17.200 TOBACCO DEPENDENCE: ICD-10-CM

## 2023-10-02 DIAGNOSIS — R55 NEAR SYNCOPE: ICD-10-CM

## 2023-10-02 DIAGNOSIS — R26.89 LOSS OF BALANCE: Primary | ICD-10-CM

## 2023-10-02 DIAGNOSIS — R55 SYNCOPE AND COLLAPSE: ICD-10-CM

## 2023-10-02 PROCEDURE — 99999 PR PBB SHADOW E&M-EST. PATIENT-LVL V: CPT | Mod: PBBFAC,,, | Performed by: NURSE PRACTITIONER

## 2023-10-02 PROCEDURE — 1159F PR MEDICATION LIST DOCUMENTED IN MEDICAL RECORD: ICD-10-PCS | Mod: CPTII,S$GLB,, | Performed by: NURSE PRACTITIONER

## 2023-10-02 PROCEDURE — 4010F PR ACE/ARB THEARPY RXD/TAKEN: ICD-10-PCS | Mod: CPTII,S$GLB,, | Performed by: NURSE PRACTITIONER

## 2023-10-02 PROCEDURE — 1160F PR REVIEW ALL MEDS BY PRESCRIBER/CLIN PHARMACIST DOCUMENTED: ICD-10-PCS | Mod: CPTII,S$GLB,, | Performed by: NURSE PRACTITIONER

## 2023-10-02 PROCEDURE — 1159F MED LIST DOCD IN RCRD: CPT | Mod: CPTII,S$GLB,, | Performed by: NURSE PRACTITIONER

## 2023-10-02 PROCEDURE — 99999 PR PBB SHADOW E&M-EST. PATIENT-LVL V: ICD-10-PCS | Mod: PBBFAC,,, | Performed by: NURSE PRACTITIONER

## 2023-10-02 PROCEDURE — 3077F SYST BP >= 140 MM HG: CPT | Mod: CPTII,S$GLB,, | Performed by: NURSE PRACTITIONER

## 2023-10-02 PROCEDURE — 1100F PTFALLS ASSESS-DOCD GE2>/YR: CPT | Mod: CPTII,S$GLB,, | Performed by: NURSE PRACTITIONER

## 2023-10-02 PROCEDURE — 1160F RVW MEDS BY RX/DR IN RCRD: CPT | Mod: CPTII,S$GLB,, | Performed by: NURSE PRACTITIONER

## 2023-10-02 PROCEDURE — 3288F PR FALLS RISK ASSESSMENT DOCUMENTED: ICD-10-PCS | Mod: CPTII,S$GLB,, | Performed by: NURSE PRACTITIONER

## 2023-10-02 PROCEDURE — 3288F FALL RISK ASSESSMENT DOCD: CPT | Mod: CPTII,S$GLB,, | Performed by: NURSE PRACTITIONER

## 2023-10-02 PROCEDURE — 99213 PR OFFICE/OUTPT VISIT, EST, LEVL III, 20-29 MIN: ICD-10-PCS | Mod: S$GLB,,, | Performed by: NURSE PRACTITIONER

## 2023-10-02 PROCEDURE — 3008F BODY MASS INDEX DOCD: CPT | Mod: CPTII,S$GLB,, | Performed by: NURSE PRACTITIONER

## 2023-10-02 PROCEDURE — 3008F PR BODY MASS INDEX (BMI) DOCUMENTED: ICD-10-PCS | Mod: CPTII,S$GLB,, | Performed by: NURSE PRACTITIONER

## 2023-10-02 PROCEDURE — 3078F DIAST BP <80 MM HG: CPT | Mod: CPTII,S$GLB,, | Performed by: NURSE PRACTITIONER

## 2023-10-02 PROCEDURE — 99213 OFFICE O/P EST LOW 20 MIN: CPT | Mod: S$GLB,,, | Performed by: NURSE PRACTITIONER

## 2023-10-02 PROCEDURE — 3077F PR MOST RECENT SYSTOLIC BLOOD PRESSURE >= 140 MM HG: ICD-10-PCS | Mod: CPTII,S$GLB,, | Performed by: NURSE PRACTITIONER

## 2023-10-02 PROCEDURE — 4010F ACE/ARB THERAPY RXD/TAKEN: CPT | Mod: CPTII,S$GLB,, | Performed by: NURSE PRACTITIONER

## 2023-10-02 PROCEDURE — 1100F PR PT FALLS ASSESS DOC 2+ FALLS/FALL W/INJURY/YR: ICD-10-PCS | Mod: CPTII,S$GLB,, | Performed by: NURSE PRACTITIONER

## 2023-10-02 PROCEDURE — 3078F PR MOST RECENT DIASTOLIC BLOOD PRESSURE < 80 MM HG: ICD-10-PCS | Mod: CPTII,S$GLB,, | Performed by: NURSE PRACTITIONER

## 2023-10-02 RX ORDER — POTASSIUM CHLORIDE 20 MEQ/1
20 TABLET, EXTENDED RELEASE ORAL 2 TIMES DAILY
COMMUNITY
Start: 2023-06-21

## 2023-10-02 RX ORDER — ACETAMINOPHEN 500 MG
500 TABLET ORAL EVERY 6 HOURS PRN
COMMUNITY

## 2023-10-02 RX ORDER — FUROSEMIDE 20 MG/1
20 TABLET ORAL
COMMUNITY
Start: 2023-06-21

## 2023-10-02 RX ORDER — CLOBETASOL PROPIONATE 0.5 MG/G
CREAM TOPICAL
COMMUNITY
Start: 2023-06-21

## 2023-10-02 NOTE — PROGRESS NOTES
Cardiology    10/2/2023  11:42 AM    Problem list  Patient Active Problem List   Diagnosis    Colon cancer screening    Essential hypertension    Venous insufficiency (chronic) (peripheral)    Hypercholesteremia    Tobacco dependence    Generalized anxiety disorder    Bilateral leg swelling    PAOD (peripheral arterial occlusive disease)    Skin infection, bacterial    Dyspnea on exertion    FH: CAD (coronary artery disease)    Pain in both lower extremities    Nocturnal leg cramps    Bilateral leg edema    Edema    Foot pain    PVC (premature ventricular contraction)    COPD, moderate    Closed fracture of right elbow    Precordial pain    Frequent PVCs    PAD (peripheral artery disease)    Near syncope    Vertigo       CC:  Syncope    HPI:  Having vision changes, syncope, falls and blacked out. Has sinus problems and needs refills of claritin and flonase.  Head spins when she puts her head down.  Can't wear closed in shoes because of the swelling she is having.  Smoked a cigarette just before our visit. She had called the number on TV to get some patches to quit smoking.  She has instant grits in the morning then a banana, then she will eat what she cooked, or a sandwich or a TV dinner. She didn't eat well for 2.5 years and was 130 lbs.  She is not sure where all the weight has come from. Sleeps on 2 pillows which is normal for her.    Medications  Current Outpatient Medications   Medication Sig Dispense Refill    acetaminophen (TYLENOL) 500 MG tablet Take 500 mg by mouth every 6 (six) hours as needed for Pain. PRN      amLODIPine (NORVASC) 5 MG tablet Take 5 mg by mouth once daily.      aspirin (ECOTRIN) 81 MG EC tablet Take 81 mg by mouth once daily.      atorvastatin (LIPITOR) 80 MG tablet Take 80 mg by mouth once daily.      carvediloL (COREG) 6.25 MG tablet Take 1 tablet (6.25 mg total) by mouth 2 (two) times daily with meals. 60 tablet 3    diazePAM (VALIUM) 10 MG Tab Take 10 mg by mouth 2 (two)  times daily as needed.      fluticasone propionate (FLONASE) 50 mcg/actuation nasal spray 1 spray by Each Nostril route once daily.      gabapentin (NEURONTIN) 300 MG capsule Take 1 capsule (300 mg total) by mouth 2 (two) times daily. 60 capsule 11    pantoprazole (PROTONIX) 40 MG tablet Take 1 tablet (40 mg total) by mouth once daily. 30 tablet 3    clobetasoL (TEMOVATE) 0.05 % cream       ergocalciferol (ERGOCALCIFEROL) 50,000 unit Cap Take 1 capsule (50,000 Units total) by mouth every 7 days. (Patient not taking: Reported on 10/2/2023) 4 capsule 11    furosemide (LASIX) 20 MG tablet Take 20 mg by mouth.      HYDROcodone-acetaminophen (NORCO) 5-325 mg per tablet Take 1 tablet by mouth every 4 (four) hours as needed for Pain. (Patient not taking: Reported on 6/20/2023) 10 tablet 0    ibuprofen (ADVIL,MOTRIN) 600 MG tablet Take 1 tablet (600 mg total) by mouth every 6 (six) hours as needed for Pain. (Patient not taking: Reported on 10/2/2023) 20 tablet 0    loratadine (CLARITIN) 10 mg tablet Take 1 tablet (10 mg total) by mouth once daily. 30 tablet 0    meclizine (ANTIVERT) 25 mg tablet Take 1 tablet (25 mg total) by mouth 3 (three) times daily as needed. (Patient not taking: Reported on 10/2/2023) 20 tablet 0    potassium chloride SA (K-DUR,KLOR-CON) 20 MEQ tablet Take 20 mEq by mouth 2 (two) times daily.       No current facility-administered medications for this visit.      Prior to Admission medications    Medication Sig Start Date End Date Taking? Authorizing Provider   acetaminophen (TYLENOL) 500 MG tablet Take 500 mg by mouth every 6 (six) hours as needed for Pain. PRN   Yes Provider, Historical   amLODIPine (NORVASC) 5 MG tablet Take 5 mg by mouth once daily.   Yes Provider, Historical   aspirin (ECOTRIN) 81 MG EC tablet Take 81 mg by mouth once daily.   Yes Provider, Historical   atorvastatin (LIPITOR) 80 MG tablet Take 80 mg by mouth once daily.   Yes Provider, Historical   carvediloL (COREG) 6.25 MG  tablet Take 1 tablet (6.25 mg total) by mouth 2 (two) times daily with meals. 4/22/22  Yes Sean Early MD   diazePAM (VALIUM) 10 MG Tab Take 10 mg by mouth 2 (two) times daily as needed.   Yes Provider, Historical   fluticasone propionate (FLONASE) 50 mcg/actuation nasal spray 1 spray by Each Nostril route once daily.   Yes Provider, Historical   gabapentin (NEURONTIN) 300 MG capsule Take 1 capsule (300 mg total) by mouth 2 (two) times daily. 7/28/23 7/27/24 Yes Sean Early MD   pantoprazole (PROTONIX) 40 MG tablet Take 1 tablet (40 mg total) by mouth once daily. 4/20/22  Yes Sean Early MD   clobetasoL (TEMOVATE) 0.05 % cream  6/21/23   Provider, Historical   ergocalciferol (ERGOCALCIFEROL) 50,000 unit Cap Take 1 capsule (50,000 Units total) by mouth every 7 days.  Patient not taking: Reported on 10/2/2023 8/31/23 8/30/24  Sean Early MD   furosemide (LASIX) 20 MG tablet Take 20 mg by mouth. 6/21/23   Provider, Historical   HYDROcodone-acetaminophen (NORCO) 5-325 mg per tablet Take 1 tablet by mouth every 4 (four) hours as needed for Pain.  Patient not taking: Reported on 6/20/2023 1/9/23   Bautista Tubbs MD   ibuprofen (ADVIL,MOTRIN) 600 MG tablet Take 1 tablet (600 mg total) by mouth every 6 (six) hours as needed for Pain.  Patient not taking: Reported on 10/2/2023 5/15/23   Jorge Joshua, NP   loratadine (CLARITIN) 10 mg tablet Take 1 tablet (10 mg total) by mouth once daily. 10/6/20 6/20/23  Adilene Johns DNP   meclizine (ANTIVERT) 25 mg tablet Take 1 tablet (25 mg total) by mouth 3 (three) times daily as needed.  Patient not taking: Reported on 10/2/2023 9/13/23   Catalina Curry MD   potassium chloride SA (K-DUR,KLOR-CON) 20 MEQ tablet Take 20 mEq by mouth 2 (two) times daily. 6/21/23   Provider, Historical         History  Past Medical History:   Diagnosis Date    Back pain     Bowel perforation 11/06/2018    Diverticulitis     Hyperlipidemia     Hypertension      Peritoneal cavity free air 11/06/2018     Past Surgical History:   Procedure Laterality Date    APPENDECTOMY  2017    CHOLECYSTECTOMY      COLONOSCOPY  11/06/2018    COLONOSCOPY N/A 11/6/2018    Procedure: COLONOSCOPY;  Surgeon: Cash Little MD;  Location: [facility] ENDO;  Service: General;  Laterality: N/A;    CORONARY ANGIOPLASTY WITH STENT PLACEMENT  2016    1 stent     DIAGNOSTIC LAPAROSCOPY N/A 11/7/2018    Procedure: LAPAROSCOPY, DIAGNOSTIC;  Surgeon: Cedric Mcknight MD;  Location: [facility] OR;  Service: General;  Laterality: N/A;    HYSTERECTOMY       Social History     Socioeconomic History    Marital status:    Tobacco Use    Smoking status: Some Days     Current packs/day: 0.50     Average packs/day: 0.5 packs/day for 20.0 years (10.0 ttl pk-yrs)     Types: Cigarettes    Smokeless tobacco: Never   Substance and Sexual Activity    Alcohol use: Never    Drug use: Not Currently    Sexual activity: Not Currently     Social Determinants of Health     Financial Resource Strain: Medium Risk (10/6/2022)    Overall Financial Resource Strain (CARDIA)     Difficulty of Paying Living Expenses: Somewhat hard   Food Insecurity: No Food Insecurity (10/6/2022)    Hunger Vital Sign     Worried About Running Out of Food in the Last Year: Never true     Ran Out of Food in the Last Year: Never true   Transportation Needs: No Transportation Needs (10/6/2022)    PRAPARE - Transportation     Lack of Transportation (Medical): No     Lack of Transportation (Non-Medical): No   Physical Activity: Inactive (10/6/2022)    Exercise Vital Sign     Days of Exercise per Week: 0 days     Minutes of Exercise per Session: 0 min   Stress: Stress Concern Present (10/6/2022)    [employer] [place] [employer] - Occupational Stress Questionnaire     Feeling of Stress : To some extent   Social Connections: Moderately Isolated (10/6/2022)    Social Connection and Isolation Panel [NHANES]     Frequency of Communication with  Friends and Family: Never     Frequency of Social Gatherings with Friends and Family: Never     Attends Protestant Services: More than 4 times per year     Active Member of Clubs or Organizations: Yes     Attends Club or Organization Meetings: Never     Marital Status:    Housing Stability: Low Risk  (10/6/2022)    Housing Stability Vital Sign     Unable to Pay for Housing in the Last Year: No     Number of Places Lived in the Last Year: 1     Unstable Housing in the Last Year: No         Allergies  Review of patient's allergies indicates:   Allergen Reactions    Sulfa (sulfonamide antibiotics) Shortness Of Breath         Review of Systems   Review of Systems   Constitutional: Positive for malaise/fatigue. Negative for diaphoresis.   HENT: Negative.     Cardiovascular:  Positive for syncope. Negative for chest pain, claudication, dyspnea on exertion, irregular heartbeat, leg swelling, near-syncope, orthopnea, palpitations and paroxysmal nocturnal dyspnea.   Respiratory:  Negative for shortness of breath.    Endocrine: Negative for polydipsia, polyphagia and polyuria.   Hematologic/Lymphatic: Does not bruise/bleed easily.   Gastrointestinal:  Negative for bloating, nausea and vomiting.   Genitourinary: Negative.    Neurological:  Negative for excessive daytime sleepiness, dizziness, light-headedness, loss of balance and weakness.   Psychiatric/Behavioral:  The patient is not nervous/anxious.    Allergic/Immunologic: Negative.          Physical Exam  Wt Readings from Last 1 Encounters:   10/02/23 75.7 kg (166 lb 14.2 oz)     BP Readings from Last 3 Encounters:   10/02/23 (!) 164/74   09/13/23 (!) 146/65   08/12/23 (!) 181/66     Pulse Readings from Last 1 Encounters:   10/02/23 81     Body mass index is 32.59 kg/m².    Physical Exam  Vitals and nursing note reviewed.   Constitutional:       Appearance: Normal appearance.   HENT:      Head: Normocephalic and atraumatic.      Mouth/Throat:      Mouth: Mucous  membranes are moist.   Eyes:      Pupils: Pupils are equal, round, and reactive to light.   Cardiovascular:      Rate and Rhythm: Normal rate and regular rhythm.      Pulses:           Radial pulses are 2+ on the right side and 2+ on the left side.        Dorsalis pedis pulses are 2+ on the right side and 2+ on the left side.        Posterior tibial pulses are 2+ on the right side and 2+ on the left side.      Heart sounds: No murmur heard.  Pulmonary:      Effort: Pulmonary effort is normal. No respiratory distress.      Breath sounds: Normal breath sounds.   Abdominal:      General: There is no distension.      Tenderness: There is no abdominal tenderness.   Musculoskeletal:      Cervical back: Normal range of motion.      Right lower leg: No edema.      Left lower leg: No edema.   Skin:     General: Skin is warm and dry.      Findings: No erythema.   Neurological:      General: No focal deficit present.      Mental Status: She is alert.   Psychiatric:         Mood and Affect: Mood normal.         Behavior: Behavior normal.      Comments: tearful         Problem List Items Addressed This Visit          Other    Tobacco dependence    Overview     Recommend complete cessation         Current Assessment & Plan     Dangers of cigarette smoking were reviewed with patient in detail. Patient was Counseled for 3-10 minutes. Nicotine replacement options were discussed. Nicotine replacement was discussed- not prescribed per patient's request         Near syncope    Overview     Refer to ENT as symptoms do worsen based on head positioning   Holter to assess for any arrhythmia that may be contributory  Holter showed ~ 4% ventricular ectopy.  No symptoms while wearing monitor, longest R-R interval was 1.5 seconds           Current Assessment & Plan     As above          Other Visit Diagnoses       Loss of balance    -  Primary    Relevant Orders    Ambulatory referral/consult to ENT    Syncope and collapse        Relevant  Orders    Holter monitor - 48 hour (Completed)                    Follow Up        @Rahel Gagnon DNP

## 2023-10-06 ENCOUNTER — TELEPHONE (OUTPATIENT)
Dept: CARDIOLOGY | Facility: CLINIC | Age: 63
End: 2023-10-06
Payer: MEDICAID

## 2023-10-06 NOTE — TELEPHONE ENCOUNTER
Spoke with patient, compression socks will help with swelling along with elevating feet as much as possible and low sodium diet, states she is currently watching sodium intake.

## 2023-10-06 NOTE — TELEPHONE ENCOUNTER
Spoke with patient when getting dressed one wire disconnected, immediately reconnected.  Thanked patient for calling. Provider informed of call.

## 2023-10-06 NOTE — TELEPHONE ENCOUNTER
----- Message from Rahel Gagnon DNP sent at 10/6/2023  1:07 PM CDT -----  Regarding: RE: pt care update  Contact: pt  Yes to compression socks.  Also low sodium diet and leg elevation  ----- Message -----  From: Roseline Dougherty MA  Sent: 10/5/2023   3:04 PM CDT  To: Rahel Gagnon DNP  Subject: FW: pt care update                               Please advise.  Roseline Casillas  ----- Message -----  From: Hilda Guerrero  Sent: 10/5/2023   1:33 PM CDT  To: Chelsi Mcginnis Staff  Subject: pt care update                                   Pt requesting call back RE: Pt states ankle and legs are swelling she would like to know if she should start wearing compression stockings. Please call to advise      Confirmed contact below:  Contact Name:Fiona Hernandez  Phone Number: [phone]

## 2023-10-06 NOTE — TELEPHONE ENCOUNTER
----- Message from Suzanne Mason MA sent at 10/6/2023 10:17 AM CDT -----  Regarding: Hoiter monitor  Contact: [phone]  Patient states part of monitor came loose but she connected it back together, Concerned if this will cause issue with readings?

## 2023-10-10 ENCOUNTER — TELEPHONE (OUTPATIENT)
Dept: PSYCHOLOGY | Facility: CLINIC | Age: 63
End: 2023-10-10
Payer: MEDICAID

## 2023-10-10 NOTE — TELEPHONE ENCOUNTER
Spoke with patient, holter results are not finalized.  I will forward message to NP Rahel Gagnon, she returns to office next week.

## 2023-10-10 NOTE — TELEPHONE ENCOUNTER
----- Message from Diann Thakur sent at 10/10/2023 11:11 AM CDT -----  Regarding: Results  Contact: Pt [phone]  Pt is calling to get results from monitor please call

## 2023-10-16 ENCOUNTER — TELEPHONE (OUTPATIENT)
Dept: CARDIOLOGY | Facility: CLINIC | Age: 63
End: 2023-10-16

## 2023-10-23 ENCOUNTER — PATIENT OUTREACH (OUTPATIENT)
Dept: EMERGENCY MEDICINE | Facility: HOSPITAL | Age: 63
End: 2023-10-23
Payer: MEDICAID

## 2023-10-23 NOTE — PROGRESS NOTES
ED Navigator spoke with patient for follow-up. Patient has been having health issues. Patient has been follow-up with her PCP and Cardiologist. Patient declined the need for assistance at this time. Patient appreciative for the follow-up call.  Isi Carlton

## 2023-10-30 ENCOUNTER — HOSPITAL ENCOUNTER (OUTPATIENT)
Dept: RADIOLOGY | Facility: HOSPITAL | Age: 63
Discharge: HOME | End: 2023-10-30
Attending: INTERNAL MEDICINE
Payer: MEDICARE

## 2023-10-30 DIAGNOSIS — Z12.31 SCREENING MAMMOGRAM FOR BREAST CANCER: ICD-10-CM

## 2023-10-30 PROCEDURE — 77067 SCR MAMMO BI INCL CAD: CPT | Mod: 26,,, | Performed by: RADIOLOGY

## 2023-10-30 PROCEDURE — 77067 SCR MAMMO BI INCL CAD: CPT | Mod: TC

## 2023-10-30 PROCEDURE — 77067 MAMMO DIGITAL SCREENING BILAT WITH TOMO: ICD-10-PCS | Mod: 26,,, | Performed by: RADIOLOGY

## 2023-10-30 PROCEDURE — 77063 BREAST TOMOSYNTHESIS BI: CPT | Mod: 26,,, | Performed by: RADIOLOGY

## 2023-10-30 PROCEDURE — 77063 MAMMO DIGITAL SCREENING BILAT WITH TOMO: ICD-10-PCS | Mod: 26,,, | Performed by: RADIOLOGY

## 2023-11-22 ENCOUNTER — PATIENT OUTREACH (OUTPATIENT)
Dept: EMERGENCY MEDICINE | Facility: HOSPITAL | Age: 63
End: 2023-11-22
Payer: MEDICAID

## 2023-11-22 NOTE — PROGRESS NOTES
ED Navigator phoned patient for follow-up. Patient doing well. Patient going to [place] to enjoy Thanksgiving with family. Patient declined the need for assistance. As always patient appreciative fo call.  Isi Carlton

## 2023-12-14 ENCOUNTER — PATIENT OUTREACH (OUTPATIENT)
Dept: EMERGENCY MEDICINE | Facility: HOSPITAL | Age: 63
End: 2023-12-14
Payer: MEDICAID

## 2023-12-14 NOTE — PROGRESS NOTES
ED Navigator phoned patient for monthly follow-up. Patient just got to local restaurant and requested a call back in the afternoon.  Isi Carlton

## 2023-12-18 ENCOUNTER — PATIENT OUTREACH (OUTPATIENT)
Dept: EMERGENCY MEDICINE | Facility: HOSPITAL | Age: 63
End: 2023-12-18
Payer: MEDICAID

## 2023-12-19 ENCOUNTER — PATIENT OUTREACH (OUTPATIENT)
Dept: EMERGENCY MEDICINE | Facility: HOSPITAL | Age: 63
End: 2023-12-19
Payer: MEDICAID

## 2023-12-19 NOTE — PROGRESS NOTES
ED Navigator phoned patient for follow-up. Patient doing ok. Patient declined the need for resources. Patient lonely and appreciative to regular phone calls.  Isi Carlton

## 2024-01-05 ENCOUNTER — PATIENT OUTREACH (OUTPATIENT)
Dept: EMERGENCY MEDICINE | Facility: HOSPITAL | Age: 64
End: 2024-01-05
Payer: MEDICARE

## 2024-01-05 NOTE — PROGRESS NOTES
ED Navigator spoke with patient for follow-up. Patient sad. Today would be the birthday of her  . Patient denied suicidal thoughts. Patient simply needed someone to talk to. Patient declined the need for assistance.   Isi Carlton

## 2024-01-09 ENCOUNTER — TELEPHONE (OUTPATIENT)
Dept: GASTROENTEROLOGY | Facility: CLINIC | Age: 64
End: 2024-01-09
Payer: MEDICARE

## 2024-01-09 NOTE — TELEPHONE ENCOUNTER
LM for pt to call office back to get scheduled for an office visit due to GI issues.       ----- Message from Aimee Dumont LPN sent at 1/9/2024  3:48 PM CST -----  Contact: [phone]    ----- Message -----  From: Viola Martinez  Sent: 1/9/2024   2:46 PM CST  To: Chinyere MONTENEGRO Staff    Pt is requesting a callback in regards to a NP appt  for GI issues. Please call. There are no available appts listed.                      Thank you

## 2024-01-12 ENCOUNTER — PATIENT OUTREACH (OUTPATIENT)
Dept: EMERGENCY MEDICINE | Facility: HOSPITAL | Age: 64
End: 2024-01-12
Payer: MEDICARE

## 2024-01-12 NOTE — PROGRESS NOTES
ED Navigator phoned patient for follow-up. Patient is a patient that is regular followed up. Patient so happy for follow-up. Patient stated she got a new phone and did not have ED Navigator's phone number. Patient doing well. Patient declined the need for assistance at this time. ED Navigation will continue to follow-up with the patient monthly.  Isi Carlton

## 2024-01-18 ENCOUNTER — TELEPHONE (OUTPATIENT)
Dept: NEUROLOGY | Facility: CLINIC | Age: 64
End: 2024-01-18
Payer: MEDICARE

## 2024-01-18 NOTE — TELEPHONE ENCOUNTER
----- Message from Marilyn Reed sent at 1/18/2024  3:36 PM CST -----  Type:  Sooner Apoointment Request    Caller is requesting a sooner appointment.  Caller declined first available appointment listed below.  Caller will not accept being placed on the waitlist and is requesting a message be sent to doctor.  Name of Caller:pt   When is the first available appointment?  Symptoms:Nocturnal leg cramps   Would the patient rather a call back or a response via [facility]? Call   Best Call Back Number:[phone]  Additional Information:pt states she was told to schedule with the neurosurgery dept at [facility] by the 17th of January.

## 2024-02-05 ENCOUNTER — OFFICE VISIT (OUTPATIENT)
Dept: NEUROLOGY | Facility: CLINIC | Age: 64
End: 2024-02-05
Payer: MEDICARE

## 2024-02-05 VITALS
HEART RATE: 91 BPM | WEIGHT: 168.69 LBS | DIASTOLIC BLOOD PRESSURE: 82 MMHG | BODY MASS INDEX: 33.12 KG/M2 | SYSTOLIC BLOOD PRESSURE: 161 MMHG | HEIGHT: 60 IN | OXYGEN SATURATION: 95 %

## 2024-02-05 DIAGNOSIS — H81.399 PERIPHERAL VERTIGO, UNSPECIFIED LATERALITY: ICD-10-CM

## 2024-02-05 DIAGNOSIS — G60.9 NEUROPATHY, IDIOPATHIC: ICD-10-CM

## 2024-02-05 DIAGNOSIS — M54.9 DORSALGIA, UNSPECIFIED: Primary | ICD-10-CM

## 2024-02-05 PROCEDURE — 3288F FALL RISK ASSESSMENT DOCD: CPT | Mod: CPTII,S$GLB,, | Performed by: INTERNAL MEDICINE

## 2024-02-05 PROCEDURE — 99999 PR PBB SHADOW E&M-EST. PATIENT-LVL IV: CPT | Mod: PBBFAC,,, | Performed by: INTERNAL MEDICINE

## 2024-02-05 PROCEDURE — 99205 OFFICE O/P NEW HI 60 MIN: CPT | Mod: S$GLB,,, | Performed by: INTERNAL MEDICINE

## 2024-02-05 PROCEDURE — 1125F AMNT PAIN NOTED PAIN PRSNT: CPT | Mod: CPTII,S$GLB,, | Performed by: INTERNAL MEDICINE

## 2024-02-05 PROCEDURE — 1100F PTFALLS ASSESS-DOCD GE2>/YR: CPT | Mod: CPTII,S$GLB,, | Performed by: INTERNAL MEDICINE

## 2024-02-05 PROCEDURE — 3008F BODY MASS INDEX DOCD: CPT | Mod: CPTII,S$GLB,, | Performed by: INTERNAL MEDICINE

## 2024-02-05 PROCEDURE — 3079F DIAST BP 80-89 MM HG: CPT | Mod: CPTII,S$GLB,, | Performed by: INTERNAL MEDICINE

## 2024-02-05 PROCEDURE — 1159F MED LIST DOCD IN RCRD: CPT | Mod: CPTII,S$GLB,, | Performed by: INTERNAL MEDICINE

## 2024-02-05 PROCEDURE — 3077F SYST BP >= 140 MM HG: CPT | Mod: CPTII,S$GLB,, | Performed by: INTERNAL MEDICINE

## 2024-02-05 RX ORDER — UBIDECARENONE 75 MG
500 CAPSULE ORAL DAILY
Qty: 120 TABLET | Refills: 2 | Status: SHIPPED | OUTPATIENT
Start: 2024-02-05 | End: 2024-04-02 | Stop reason: SDUPTHER

## 2024-02-05 RX ORDER — GABAPENTIN 300 MG/1
600 CAPSULE ORAL 3 TIMES DAILY
Qty: 180 CAPSULE | Refills: 11 | Status: SHIPPED | OUTPATIENT
Start: 2024-02-05 | End: 2025-02-04

## 2024-02-05 NOTE — PROGRESS NOTES
GENERAL NEUROLOGY VISIT   02/05/2024  History:    Patient is a 64 y.o. female with past medical history of, fibromyalgia,, hyperlipidemia, hypertension, PAD s/p stenting presented to the clinic for evaluation of lower back pain and leg pain.  Patient was initially referred to me for vertigo but she insists of her vertigo is resolved and does not want to discuss about this further.    Patient states that she has had lower back pain for a long time and has undergone multiple lower back surgeries.  She states that this past year has been excessively difficult for her as she has been experiencing significant swelling and pain in her lower extremities.  She states that she has lower back pain that goes down into both her hips which is a deep aching pain.  She also has burning pain in her feet going above the ankle.  She has had 2 falls this past year as her legs gave out with ambulation.  She has been maintained on gabapentin 300 mg b.i.d. for the last 5 years.  She states that she also has a stent in her left SFA.  She also complains of pain and burning in her hands but states that she also has a history of fibromyalgia that is not well controlled as well.  Vitamin B12 obtained in October 2023 was 316 and was previously as low as 139 5 years back.  Patient denies any daily multivitamin supplementation.  She does state that her diet is very poor.    Past Medical History:   Diagnosis Date    Back pain     Bowel perforation 11/06/2018    Diverticulitis     Hyperlipidemia     Hypertension     Peritoneal cavity free air 11/06/2018       Past Surgical History:   Procedure Laterality Date    APPENDECTOMY  2017    CHOLECYSTECTOMY      COLONOSCOPY  11/06/2018    COLONOSCOPY N/A 11/6/2018    Procedure: COLONOSCOPY;  Surgeon: Cash Little MD;  Location: [facility];  Service: General;  Laterality: N/A;    CORONARY ANGIOPLASTY WITH STENT PLACEMENT  2016    1 stent     DIAGNOSTIC LAPAROSCOPY N/A 11/7/2018    Procedure:  LAPAROSCOPY, DIAGNOSTIC;  Surgeon: Cedric Mcknight MD;  Location: [facility];  Service: General;  Laterality: N/A;    HYSTERECTOMY         Social History     Socioeconomic History    Marital status:    Tobacco Use    Smoking status: Some Days     Current packs/day: 0.50     Average packs/day: 0.5 packs/day for 20.0 years (10.0 ttl pk-yrs)     Types: Cigarettes    Smokeless tobacco: Never   Substance and Sexual Activity    Alcohol use: Never    Drug use: Not Currently    Sexual activity: Not Currently     Social Determinants of Health     Financial Resource Strain: Medium Risk (10/6/2022)    Overall Financial Resource Strain (CARDIA)     Difficulty of Paying Living Expenses: Somewhat hard   Food Insecurity: No Food Insecurity (10/6/2022)    Hunger Vital Sign     Worried About Running Out of Food in the Last Year: Never true     Ran Out of Food in the Last Year: Never true   Transportation Needs: No Transportation Needs (10/6/2022)    PRAPARE - Transportation     Lack of Transportation (Medical): No     Lack of Transportation (Non-Medical): No   Physical Activity: Inactive (10/6/2022)    Exercise Vital Sign     Days of Exercise per Week: 0 days     Minutes of Exercise per Session: 0 min   Stress: Stress Concern Present (10/6/2022)    [employer] - Occupational Stress Questionnaire     Feeling of Stress : To some extent   Social Connections: Moderately Isolated (10/6/2022)    Social Connection and Isolation Panel [NHANES]     Frequency of Communication with Friends and Family: Never     Frequency of Social Gatherings with Friends and Family: Never     Attends Zoroastrian Services: More than 4 times per year     Active Member of Clubs or Organizations: Yes     Attends Club or Organization Meetings: Never     Marital Status:    Housing Stability: Low Risk  (10/6/2022)    Housing Stability Vital Sign     Unable to Pay for Housing in the Last Year: No     Number of Places Lived  in the Last Year: 1     Unstable Housing in the Last Year: No       Review of patient's allergies indicates:   Allergen Reactions    Sulfa (sulfonamide antibiotics) Shortness Of Breath       Current Outpatient Medications on File Prior to Visit   Medication Sig Dispense Refill    acetaminophen (TYLENOL) 500 MG tablet Take 500 mg by mouth every 6 (six) hours as needed for Pain. PRN      amLODIPine (NORVASC) 5 MG tablet Take 5 mg by mouth once daily.      aspirin (ECOTRIN) 81 MG EC tablet Take 81 mg by mouth once daily.      atorvastatin (LIPITOR) 80 MG tablet Take 1 tablet (80 mg total) by mouth once daily. 90 tablet 3    carvediloL (COREG) 6.25 MG tablet Take 1 tablet (6.25 mg total) by mouth 2 (two) times daily with meals. 60 tablet 3    diazePAM (VALIUM) 10 MG Tab Take 10 mg by mouth 2 (two) times daily as needed.      fluticasone propionate (FLONASE) 50 mcg/actuation nasal spray 1 spray by Each Nostril route once daily.      gabapentin (NEURONTIN) 300 MG capsule Take 1 capsule (300 mg total) by mouth 2 (two) times daily. 60 capsule 11    loratadine (CLARITIN) 10 mg tablet Take 1 tablet (10 mg total) by mouth once daily. 30 tablet 0    pantoprazole (PROTONIX) 40 MG tablet Take 1 tablet (40 mg total) by mouth once daily. 30 tablet 3    clobetasoL (TEMOVATE) 0.05 % cream       ergocalciferol (ERGOCALCIFEROL) 50,000 unit Cap Take 1 capsule (50,000 Units total) by mouth every 7 days. (Patient not taking: Reported on 2/5/2024) 4 capsule 11    furosemide (LASIX) 20 MG tablet Take 20 mg by mouth.      potassium chloride SA (K-DUR,KLOR-CON) 20 MEQ tablet Take 20 mEq by mouth 2 (two) times daily.       No current facility-administered medications on file prior to visit.        Family history:  Noncontributory    Review Of Systems     Constitutional Negative for fevers, chills, weigh loss   HEENT Negative for hearing loss, dysphagia, sore throat, diplopia   Respiratory Negative for shortness of breath, cough     Cardiovascular Negative for chest pain, palpitations    Gastrointestinal Negative for constipation, diarrhea, early satiety    Skin Negative for rashes    Musculoskeletal Negative for joint pains, myalgias.   Neurological See Above    Psychological Negative for sleep disturbances.    Heme/Lymph Negative for easy bruising, easy bleeding    Endocrine Negative for polyuria, polydypsia     Physical Exam:     Physical Examination  BP (!) 161/82 (BP Location: Left arm, Patient Position: Sitting, BP Method: Medium (Automatic))   Pulse 91   Ht 5' (1.524 m)   Wt 76.5 kg (168 lb 11.2 oz)   SpO2 95%   BMI 32.95 kg/m²   Body mass index is 32.95 kg/m².      Neurological Exam  Mental Status:   Alert and oriented to name, date, location, president.   Naming/Fluency/Comprehension/Repetition intact.   Recent/remote memory, registration, attention span/concentration, fund of knowledge intact by history.    CN:   II, III, IV, VI: PERRL, EOMI, no nystagmus, fundus not visualized due to inadequate dilation.V: intact to fine touch, temperature, pain.VII: symmetrical facial movement, nice smile, no drooping.VIII: grossly intact to hearing.IX, X: symmetrical palate, normal palatal elevation.XI: 5/5 SCM and 5/5 trapezius.XII: tongue midline, 5/5, no deviation or fasciculation.           Motor:    ShAb ElbEx ElbFle WrE WrFle Interos  HipFl KnExt KnFlex FtDors FtPlant   Left 5 5 5 5 5 5 5 4 4- 4- 4- 4+   Right 5 5 5 5 5 5 5 5 4+ 4+ 4+ 4+   Tone: Normal tone in UE and LE, no atrophy, no fasciculation, no tremor no pronator drift.                Reflexes:    Bicep Tricep Brachioradial Patellar Achilles   Left 2+ 2+ 2+ 2+ 1+   Right 2+ 2+ 2+ 2+ 1+   No Clonus, down going toes b/l.    Sensation: on both UEs and LEs    Light Touch: Normal.Pinprick: Normal.Vibration:  Reduced below the ankle Temperature: Normal.    Coordination:    Tremor: Absent.    Gait:    Wide-based gait, Romberg positive    Interval/Previous Work-up:        Impression:   64 y.o. female with past medical history of, fibromyalgia,, hyperlipidemia, hypertension, PAD s/p stenting presented to the clinic for evaluation of lower back pain and leg pain.  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     Plan:   - MRI L-spine without contrast ordered  - referral placed to PT and OT  - increased gabapentin to 600 mg t.i.d.  - CV arterial Doppler ordered bilateral lower extremities  - patient will need vitamin B12 supplementation, ordered  - repeat vitamin B12 in 3 months.      RTC 3 months or sooner if needed    Jovanny Payton MD  Neurology

## 2024-02-07 ENCOUNTER — TELEPHONE (OUTPATIENT)
Dept: PODIATRY | Facility: CLINIC | Age: 64
End: 2024-02-07
Payer: MEDICARE

## 2024-02-07 NOTE — TELEPHONE ENCOUNTER
Scheduled patient for June and added her to a wait list. Verbalized understanding and no further issues discussed. ----- Message from Niki Layton sent at 2/7/2024  1:26 PM CST -----  Regarding: Pt needs an appt for nail care  Name of Who is Calling: PABLO BURROWS [[id number]]        What is the request in detail:Pt called to sche an appt for nail care none avail in [place]. Please advise        Can the clinic reply by [facility]:no        What Number to Call Back if not in [facility]: Telephone Information:  Mobile          [phone]

## 2024-02-22 ENCOUNTER — PATIENT OUTREACH (OUTPATIENT)
Dept: EMERGENCY MEDICINE | Facility: HOSPITAL | Age: 64
End: 2024-02-22
Payer: MEDICARE

## 2024-02-22 ENCOUNTER — HOSPITAL ENCOUNTER (OUTPATIENT)
Dept: CARDIOLOGY | Facility: HOSPITAL | Age: 64
Discharge: HOME | End: 2024-02-22
Attending: INTERNAL MEDICINE
Payer: MEDICARE

## 2024-02-22 DIAGNOSIS — M54.9 DORSALGIA, UNSPECIFIED: ICD-10-CM

## 2024-02-22 LAB
LEFT ANT TIBIAL SYS PSV: 51 CM/S
LEFT CFA PSV: 203 CM/S
LEFT EXTERNAL ILIAC PSV: 184 CM/S
LEFT PERONEAL SYS PSV: 44 CM/S
LEFT POPLITEAL PSV: 69 CM/S
LEFT POST TIBIAL SYS PSV: 86 CM/S
LEFT PROFUNDA SYS PSV: 114 CM/S
LEFT SUPER FEMORAL DIST SYS PSV: 84 CM/S
LEFT SUPER FEMORAL MID SYS PSV: 204 CM/S
LEFT SUPER FEMORAL OSTIAL SYS PSV: 140 CM/S
LEFT SUPER FEMORAL PROX SYS PSV: 165 CM/S
LEFT TIB/PER TRUNK SYS PSV: 108 CM/S
OHS CV LEFT COMMON ILIAC ARTERY PSV: 203 CM/S
OHS CV LEFT LOWER EXTREMITY ABI (NO CALC): 0.86
OHS CV RIGHT ABI LOWER EXTREMITY (NO CALC): 1
OHS CV US RIGHT COMMON ILIAC PSV: 189 CM/S
RIGHT ANT TIBIAL SYS PSV: 93 CM/S
RIGHT CFA PSV: 176 CM/S
RIGHT EXTERNAL ILLIAC PSV: 201 CM/S
RIGHT PERONEAL SYS PSV: 53 CM/S
RIGHT POPLITEAL PSV: 66 CM/S
RIGHT POST TIBIAL SYS PSV: 81 CM/S
RIGHT PROFUNDA SYS PSV: 143 CM/S
RIGHT SUPER FEMORAL DIST SYS PSV: 181 CM/S
RIGHT SUPER FEMORAL MID SYS PSV: 197 CM/S
RIGHT SUPER FEMORAL OSTIAL SYS PSV: 87 CM/S
RIGHT SUPER FEMORAL PROX SYS PSV: 158 CM/S
RIGHT TIB/PER TRUNK SYS PSV: 118 CM/S

## 2024-02-22 PROCEDURE — 93925 LOWER EXTREMITY STUDY: CPT | Mod: 26,,, | Performed by: INTERNAL MEDICINE

## 2024-02-22 PROCEDURE — 93925 LOWER EXTREMITY STUDY: CPT | Mod: PO

## 2024-02-22 NOTE — PROGRESS NOTES
ED Navigator phoned patient for follow-up. Patient doing well. Patient has been going to multiple appointments. Patient has no need for resources/assistance at this time.  Isi Carlton

## 2024-03-04 ENCOUNTER — TELEPHONE (OUTPATIENT)
Dept: NEUROLOGY | Facility: CLINIC | Age: 64
End: 2024-03-04
Payer: MEDICARE

## 2024-03-04 NOTE — TELEPHONE ENCOUNTER
----- Message from Yari Lucas MA sent at 3/4/2024  8:54 AM CST -----  Regarding: FW: Results  Contact: [phone]    ----- Message -----  From: Mariano Vanessa  Sent: 3/4/2024   8:46 AM CST  To: [facility] Staff  Subject: Results                                          Patient calling to speak with provider or nurse regarding recent results of labs/testing. Please call and discuss with patient.

## 2024-03-05 ENCOUNTER — TELEPHONE (OUTPATIENT)
Dept: NEUROLOGY | Facility: CLINIC | Age: 64
End: 2024-03-05
Payer: MEDICARE

## 2024-03-05 NOTE — TELEPHONE ENCOUNTER
Tried calling the patient at 3.10 PM on 3/5/2024 but unable to reach out to her.  Left a voicemail.     Jovanny Payton MD

## 2024-03-05 NOTE — TELEPHONE ENCOUNTER
----- Message from Aimee Wise MA sent at 3/4/2024 11:07 AM CST -----  Regarding: Results  Patient asking to speak with you regarding recent results of labs/testing.

## 2024-03-05 NOTE — TELEPHONE ENCOUNTER
----- Message from Yari Lucas MA sent at 3/5/2024 10:56 AM CST -----  Regarding: FW: results  Contact: [phone]    ----- Message -----  From: Mackenzie Decker  Sent: 3/5/2024  10:45 AM CST  To: [facility] Staff  Subject: results                                          Pt is requesting results from all the test and scans she took. Pt left a message previously with no response. This pt second message. Pls call to discuss.

## 2024-03-13 ENCOUNTER — TELEPHONE (OUTPATIENT)
Dept: NEUROLOGY | Facility: CLINIC | Age: 64
End: 2024-03-13
Payer: MEDICARE

## 2024-03-13 NOTE — TELEPHONE ENCOUNTER
----- Message from Zonia Campos MA sent at 3/13/2024 10:00 AM CDT -----  Regarding: FW: Appt / Results  Contact: [phone]    ----- Message -----  From: Julia Broderick  Sent: 3/13/2024   9:53 AM CDT  To: [facility]ito Petty Staff  Subject: Appt / Results                                   Fiona Hernandez calling regarding Appointment Access  (message) for # pt is calling to speak with provider schedule an appt and go over test results pt is not on portal please call, pt has been trying to set an appt for a while.

## 2024-03-22 ENCOUNTER — TELEPHONE (OUTPATIENT)
Dept: NEUROLOGY | Facility: CLINIC | Age: 64
End: 2024-03-22
Payer: MEDICARE

## 2024-03-22 NOTE — TELEPHONE ENCOUNTER
----- Message from Yari Lucas MA sent at 3/22/2024  9:39 AM CDT -----  Regarding: FW: Appt Access  Contact: [phone]    ----- Message -----  From: Carol Shannon  Sent: 3/22/2024   9:21 AM CDT  To: [facility] Staff  Subject: Appt Access                                      Pt is calling stating that she missed appt scheduled w provider for today 3/22 @9am due to transportation not showing up because of the weather. Pt wants to know if she can do a virtual audio call w provider today if possible. If not please give pt a call back to get her rescheduled.

## 2024-03-22 NOTE — TELEPHONE ENCOUNTER
Spoke with patient, her ride through [employer] did not show up, rescheduled appt with Dr. Payton to 4/02/24 @ 2:30 PM.

## 2024-04-02 ENCOUNTER — OFFICE VISIT (OUTPATIENT)
Dept: NEUROLOGY | Facility: CLINIC | Age: 64
End: 2024-04-02
Payer: MEDICARE

## 2024-04-02 VITALS
HEART RATE: 93 BPM | WEIGHT: 172.5 LBS | HEIGHT: 60 IN | DIASTOLIC BLOOD PRESSURE: 85 MMHG | SYSTOLIC BLOOD PRESSURE: 178 MMHG | OXYGEN SATURATION: 96 % | BODY MASS INDEX: 33.86 KG/M2

## 2024-04-02 DIAGNOSIS — G60.9 NEUROPATHY, IDIOPATHIC: Primary | ICD-10-CM

## 2024-04-02 PROCEDURE — 99214 OFFICE O/P EST MOD 30 MIN: CPT | Mod: S$GLB,,, | Performed by: INTERNAL MEDICINE

## 2024-04-02 PROCEDURE — 3079F DIAST BP 80-89 MM HG: CPT | Mod: CPTII,S$GLB,, | Performed by: INTERNAL MEDICINE

## 2024-04-02 PROCEDURE — 3008F BODY MASS INDEX DOCD: CPT | Mod: CPTII,S$GLB,, | Performed by: INTERNAL MEDICINE

## 2024-04-02 PROCEDURE — 1125F AMNT PAIN NOTED PAIN PRSNT: CPT | Mod: CPTII,S$GLB,, | Performed by: INTERNAL MEDICINE

## 2024-04-02 PROCEDURE — 1101F PT FALLS ASSESS-DOCD LE1/YR: CPT | Mod: CPTII,S$GLB,, | Performed by: INTERNAL MEDICINE

## 2024-04-02 PROCEDURE — 99999 PR PBB SHADOW E&M-EST. PATIENT-LVL III: CPT | Mod: PBBFAC,,, | Performed by: INTERNAL MEDICINE

## 2024-04-02 PROCEDURE — 3077F SYST BP >= 140 MM HG: CPT | Mod: CPTII,S$GLB,, | Performed by: INTERNAL MEDICINE

## 2024-04-02 PROCEDURE — 1159F MED LIST DOCD IN RCRD: CPT | Mod: CPTII,S$GLB,, | Performed by: INTERNAL MEDICINE

## 2024-04-02 PROCEDURE — 3288F FALL RISK ASSESSMENT DOCD: CPT | Mod: CPTII,S$GLB,, | Performed by: INTERNAL MEDICINE

## 2024-04-02 RX ORDER — LANOLIN ALCOHOL/MO/W.PET/CERES
1000 CREAM (GRAM) TOPICAL DAILY
Qty: 100 TABLET | Refills: 1 | Status: SHIPPED | OUTPATIENT
Start: 2024-04-02

## 2024-04-02 NOTE — PROGRESS NOTES
GENERAL NEUROLOGY VISIT   04/02/2024  History:    Patient is a 64 y.o. female with past medical history of, fibromyalgia,, hyperlipidemia, hypertension, PAD s/p stenting presented to the clinic for follow up of lower back pain and leg pain.  Patient was last seen in clinic on 02/05/2024.    Initial history 2/5/2024  Patient states that she has had lower back pain for a long time and has undergone multiple lower back surgeries.  She states that this past year has been excessively difficult for her as she has been experiencing significant swelling and pain in her lower extremities.  She states that she has lower back pain that goes down into both her hips which is a deep aching pain.  She also has burning pain in her feet going above the ankle.  She has had 2 falls this past year as her legs gave out with ambulation.  She has been maintained on gabapentin 300 mg b.i.d. for the last 5 years.  She states that she also has a stent in her left SFA.  She also complains of pain and burning in her hands but states that she also has a history of fibromyalgia that is not well controlled as well.  Vitamin B12 obtained in October 2023 was 316 and was previously as low as 139 5 years back.  Patient denies any daily multivitamin supplementation.  She does state that her diet is very poor.    Interval history 04/02/2024:   Patient states that she has significant swelling in her legs, has gained 15 lb in 1 month due to fluid retention.  Followed up with Cardiology, had nuclear stress test done in 2023.  Has been taking her vitamin B12 supplementation.  Has continued to take gabapentin at 300 mg t.i.d. and did not increase as discussed last time as it did not say it on the bottle.  She was also supposed to take Lasix since 2023 but says that medication was never mail to her from her pharmacy.  Discussed MRI L-spine results with her.    Past Medical History:   Diagnosis Date    Back pain     Bowel perforation 11/06/2018     Diverticulitis     Hyperlipidemia     Hypertension     Peritoneal cavity free air 11/06/2018       Past Surgical History:   Procedure Laterality Date    APPENDECTOMY  2017    CHOLECYSTECTOMY      COLONOSCOPY  11/06/2018    COLONOSCOPY N/A 11/6/2018    Procedure: COLONOSCOPY;  Surgeon: Cash Little MD;  Location: [facility] ENDO;  Service: General;  Laterality: N/A;    CORONARY ANGIOPLASTY WITH STENT PLACEMENT  2016    1 stent     DIAGNOSTIC LAPAROSCOPY N/A 11/7/2018    Procedure: LAPAROSCOPY, DIAGNOSTIC;  Surgeon: Cedric Mcknight MD;  Location: [facility] OR;  Service: General;  Laterality: N/A;    HYSTERECTOMY         Social History     Socioeconomic History    Marital status:    Tobacco Use    Smoking status: Some Days     Current packs/day: 0.50     Average packs/day: 0.5 packs/day for 20.0 years (10.0 ttl pk-yrs)     Types: Cigarettes    Smokeless tobacco: Never   Substance and Sexual Activity    Alcohol use: Never    Drug use: Not Currently    Sexual activity: Not Currently     Social Determinants of Health     Financial Resource Strain: Medium Risk (10/6/2022)    Overall Financial Resource Strain (CARDIA)     Difficulty of Paying Living Expenses: Somewhat hard   Food Insecurity: No Food Insecurity (10/6/2022)    Hunger Vital Sign     Worried About Running Out of Food in the Last Year: Never true     Ran Out of Food in the Last Year: Never true   Transportation Needs: No Transportation Needs (10/6/2022)    PRAPARE - Transportation     Lack of Transportation (Medical): No     Lack of Transportation (Non-Medical): No   Physical Activity: Inactive (10/6/2022)    Exercise Vital Sign     Days of Exercise per Week: 0 days     Minutes of Exercise per Session: 0 min   Stress: Stress Concern Present (10/6/2022)    [employer] - Occupational Stress Questionnaire     Feeling of Stress : To some extent   Social Connections: Moderately Isolated (10/6/2022)    Social Connection and  Isolation Panel [NHANES]     Frequency of Communication with Friends and Family: Never     Frequency of Social Gatherings with Friends and Family: Never     Attends Alevism Services: More than 4 times per year     Active Member of Clubs or Organizations: Yes     Attends Club or Organization Meetings: Never     Marital Status:    Housing Stability: Low Risk  (10/6/2022)    Housing Stability Vital Sign     Unable to Pay for Housing in the Last Year: No     Number of Places Lived in the Last Year: 1     Unstable Housing in the Last Year: No       Review of patient's allergies indicates:   Allergen Reactions    Sulfa (sulfonamide antibiotics) Shortness Of Breath       Current Outpatient Medications on File Prior to Visit   Medication Sig Dispense Refill    acetaminophen (TYLENOL) 500 MG tablet Take 500 mg by mouth every 6 (six) hours as needed for Pain. PRN      amLODIPine (NORVASC) 5 MG tablet Take 5 mg by mouth once daily.      aspirin (ECOTRIN) 81 MG EC tablet Take 81 mg by mouth once daily.      atorvastatin (LIPITOR) 80 MG tablet Take 1 tablet (80 mg total) by mouth once daily. 90 tablet 3    carvediloL (COREG) 6.25 MG tablet Take 1 tablet (6.25 mg total) by mouth 2 (two) times daily with meals. 60 tablet 3    cyanocobalamin (VITAMIN B-12) 500 MCG tablet Take 1 tablet (500 mcg total) by mouth once daily. 120 tablet 2    diazePAM (VALIUM) 10 MG Tab Take 10 mg by mouth 2 (two) times daily as needed.      fluticasone propionate (FLONASE) 50 mcg/actuation nasal spray 1 spray by Each Nostril route once daily.      gabapentin (NEURONTIN) 300 MG capsule Take 2 capsules (600 mg total) by mouth 3 (three) times daily. 180 capsule 11    loratadine (CLARITIN) 10 mg tablet Take 1 tablet (10 mg total) by mouth once daily. 30 tablet 3    pantoprazole (PROTONIX) 40 MG tablet Take 1 tablet (40 mg total) by mouth once daily. 30 tablet 3    potassium chloride SA (K-DUR,KLOR-CON) 20 MEQ tablet Take 20 mEq by mouth 2 (two)  times daily.      clobetasoL (TEMOVATE) 0.05 % cream       ergocalciferol (ERGOCALCIFEROL) 50,000 unit Cap Take 1 capsule (50,000 Units total) by mouth every 7 days. (Patient not taking: Reported on 4/2/2024) 4 capsule 11    furosemide (LASIX) 20 MG tablet Take 20 mg by mouth.       No current facility-administered medications on file prior to visit.        Family history:  Noncontributory    Review Of Systems     Constitutional Negative for fevers, chills, weigh loss   HEENT Negative for hearing loss, dysphagia, sore throat, diplopia   Respiratory Negative for shortness of breath, cough    Cardiovascular Negative for chest pain, palpitations    Gastrointestinal Negative for constipation, diarrhea, early satiety    Skin Negative for rashes    Musculoskeletal Negative for joint pains, myalgias.   Neurological See Above    Psychological Negative for sleep disturbances.    Heme/Lymph Negative for easy bruising, easy bleeding    Endocrine Negative for polyuria, polydypsia     Physical Exam:     Physical Examination  BP (!) 178/85 (BP Location: Left arm, Patient Position: Sitting, BP Method: Medium (Automatic))   Pulse 93   Ht 5' (1.524 m)   Wt 78.2 kg (172 lb 8 oz)   SpO2 96%   BMI 33.69 kg/m²   Body mass index is 33.69 kg/m².      Neurological Exam  Mental Status:   Alert and oriented to name, date, location, president.   Naming/Fluency/Comprehension/Repetition intact.   Recent/remote memory, registration, attention span/concentration, fund of knowledge intact by history.    CN:   II, III, IV, VI: PERRL, EOMI, no nystagmus, fundus not visualized due to inadequate dilation.V: intact to fine touch, temperature, pain.VII: symmetrical facial movement, nice smile, no drooping.VIII: grossly intact to hearing.IX, X: symmetrical palate, normal palatal elevation.XI: 5/5 SCM and 5/5 trapezius.XII: tongue midline, 5/5, no deviation or fasciculation.           Motor:    ShAb ElbEx ElbFle WrE WrFle Interos  HipFl KnExt  KnFlex FtDors FtPlant   Left 5 5 5 5 5 5 5 4 4- 4- 4- 4+   Right 5 5 5 5 5 5 5 5 4+ 4+ 4+ 4+   Tone: Normal tone in UE and LE, no atrophy, no fasciculation, no tremor no pronator drift.                Reflexes:    Bicep Tricep Brachioradial Patellar Achilles   Left 2+ 2+ 2+ 2+ 1+   Right 2+ 2+ 2+ 2+ 1+   No Clonus, down going toes b/l.    Sensation: on both UEs and LEs    Light Touch: Normal.Pinprick: Normal.Vibration:  Reduced below the ankle Temperature: Normal.    Coordination:    Tremor: Absent.    Gait:    Wide-based gait, Romberg positive    Interval/Previous Work-up:   MRI L-spine without contrast:  02/19/2024 Mild lower lumbar spondylosis, further detailed above. Levels of partial disc desiccation as well as small posterior annular fissure at L5-S1. No significant spinal canal stenosis.     Arterial Dopplers bilateral lower extremities:  02/22/2024     Right resting JOSE MANUEL 1.0, is normal.    Left resting JOSE MANUEL 0.86, is suggestive of mild left lower extremity arterial disease.    Doppler ultrasound shows bilateral lower extremity arteries no evidence of hemodynamically significant stenosis.    Right SFA stents are patent.    Impression:   #polyneuropathy  Although patient has significant lower back pain, she does not have correlating changes on imaging.  Length-dependent axonal neuropathy from vitamin B12 deficiency.  She also has significant pitting pedal edema contributing to the pain    Plan:   - patient has a follow up set up with Cardiology in May  - increased gabapentin to 600 mg t.i.d.  - continuing to supplement with vitamin B12.  Currently taking 3000 mcg daily  - repeat vitamin B12 in 3 months.      RTC 6 months or sooner if needed    Jovanny Payton MD  Neurology

## 2024-04-03 ENCOUNTER — TELEPHONE (OUTPATIENT)
Dept: NEUROLOGY | Facility: CLINIC | Age: 64
End: 2024-04-03
Payer: MEDICARE

## 2024-04-03 NOTE — TELEPHONE ENCOUNTER
----- Message from Bam Jones LPN sent at 4/2/2024  4:59 PM CDT -----  Contact: [phone]    ----- Message -----  From: Ree Elena  Sent: 4/2/2024   4:56 PM CDT  To: [facility]ito [facility] Staff    PABLO BURROWS calling regarding Patient Advice (message) Pt asking for  a call ack she states she only got one Rx and its suppose to be 2 Rx asking for a call back to discuss what happen.

## 2024-04-08 NOTE — TELEPHONE ENCOUNTER
----- Message from Diana Robert sent at 10/16/2023  9:25 AM CDT -----  Regarding: results  Contact: [phone]  Pt calling  in regarding results of test please call to discuss Further      
Spoke with patient, will forward request for holter results to NICOLAS Gagnon.   
Back pain

## 2024-05-07 ENCOUNTER — OFFICE VISIT (OUTPATIENT)
Dept: GASTROENTEROLOGY | Facility: CLINIC | Age: 64
End: 2024-05-07
Payer: MEDICARE

## 2024-05-07 VITALS
HEIGHT: 60 IN | BODY MASS INDEX: 33.55 KG/M2 | SYSTOLIC BLOOD PRESSURE: 128 MMHG | DIASTOLIC BLOOD PRESSURE: 75 MMHG | WEIGHT: 170.88 LBS | OXYGEN SATURATION: 98 % | HEART RATE: 83 BPM

## 2024-05-07 DIAGNOSIS — R10.9 ABDOMINAL CRAMPING: ICD-10-CM

## 2024-05-07 DIAGNOSIS — R10.9 ABDOMINAL PAIN, UNSPECIFIED ABDOMINAL LOCATION: ICD-10-CM

## 2024-05-07 PROCEDURE — 3288F FALL RISK ASSESSMENT DOCD: CPT | Mod: CPTII,S$GLB,, | Performed by: STUDENT IN AN ORGANIZED HEALTH CARE EDUCATION/TRAINING PROGRAM

## 2024-05-07 PROCEDURE — 3008F BODY MASS INDEX DOCD: CPT | Mod: CPTII,S$GLB,, | Performed by: STUDENT IN AN ORGANIZED HEALTH CARE EDUCATION/TRAINING PROGRAM

## 2024-05-07 PROCEDURE — 3074F SYST BP LT 130 MM HG: CPT | Mod: CPTII,S$GLB,, | Performed by: STUDENT IN AN ORGANIZED HEALTH CARE EDUCATION/TRAINING PROGRAM

## 2024-05-07 PROCEDURE — 3078F DIAST BP <80 MM HG: CPT | Mod: CPTII,S$GLB,, | Performed by: STUDENT IN AN ORGANIZED HEALTH CARE EDUCATION/TRAINING PROGRAM

## 2024-05-07 PROCEDURE — 1159F MED LIST DOCD IN RCRD: CPT | Mod: CPTII,S$GLB,, | Performed by: STUDENT IN AN ORGANIZED HEALTH CARE EDUCATION/TRAINING PROGRAM

## 2024-05-07 PROCEDURE — 99999 PR PBB SHADOW E&M-EST. PATIENT-LVL IV: CPT | Mod: PBBFAC,,, | Performed by: STUDENT IN AN ORGANIZED HEALTH CARE EDUCATION/TRAINING PROGRAM

## 2024-05-07 PROCEDURE — 1101F PT FALLS ASSESS-DOCD LE1/YR: CPT | Mod: CPTII,S$GLB,, | Performed by: STUDENT IN AN ORGANIZED HEALTH CARE EDUCATION/TRAINING PROGRAM

## 2024-05-07 PROCEDURE — 99204 OFFICE O/P NEW MOD 45 MIN: CPT | Mod: S$GLB,,, | Performed by: STUDENT IN AN ORGANIZED HEALTH CARE EDUCATION/TRAINING PROGRAM

## 2024-05-07 PROCEDURE — 1125F AMNT PAIN NOTED PAIN PRSNT: CPT | Mod: CPTII,S$GLB,, | Performed by: STUDENT IN AN ORGANIZED HEALTH CARE EDUCATION/TRAINING PROGRAM

## 2024-05-07 NOTE — PROGRESS NOTES
[facility]    Reason for visit: Diagnoses of Abdominal pain, unspecified abdominal location and Abdominal cramping were pertinent to this visit.  Referring Provider/PCP: Sean Early MD    History of Present Illness:  Fiona Hernandez is a 64 y.o. female with a history of hypertension, hyperlipidemia, peripheral artery disease who is presenting for initial evaluation of abdominal pain.    She reports that for the past several months she has been having lower abdominal pain that is sharp/crampy radiating to the upper abdomen, followed by the urge to have a bowel movement.  She has bowel movements every day but feels like she does not completely empty with a smaller amounts of stool.  She also gained about 40 lb although she thinks she has not eating as much to cause this weight gain.  Denies blood in the stool, diarrhea, nausea or vomiting.  The pain improves partially after she has a bowel movement.  She has tried stool softeners with minimal improvement.  She had a colonoscopy in 2018, images reviewed, she had 1 small polyp removed (hyperplastic) but the colonoscopies complicated by sigmoid and rectal perforation requiring laparoscopy and colonic resection.      Physical Exam:  Constitutional:  not in acute distress and well developed  HENT: Head: Normal, normocephalic, atraumatic.  Eyes: conjunctiva clear and sclera nonicteric  GI: soft, non-tender, without masses or organomegaly  Skin: normal color  Neurological: alert, oriented x3  Psychiatric: mood and affect are within normal limits, pt is a good historian; no memory problems were noted    Laboratory:  Lab Results   Component Value Date/Time    HGB 13.1 10/30/2023 08:50 AM    HGB 13.1 10/27/2023 01:49 AM    AST 24 10/30/2023 08:50 AM    AST 21 10/27/2023 01:49 AM    ALT 23 10/30/2023 08:50 AM    ALT 19 10/27/2023 01:49 AM    BILITOT 0.3 10/30/2023 08:50 AM    BILITOT 0.3 10/27/2023 01:49 AM     Reviewed.  Normal Hgb,  LFTs    Imaging:  See HPI.    Endoscopy:  See HPI    Assessment:  Fiona Hernandez is a 64 y.o. female who is presenting for initial evaluation of lowe abdominal pain.    Problems:  IBS-C    The patient reports lower abdominal pain improved by bowel movements associated with incomplete bowel movements consistent with IBS with constipation.  Likely worsened with stress after her  passed.  No red flags including normal labs, no blood in the stool, no nocturnal symptoms and no weight loss.  Will plan on treating this with fiber supplementation and if there is no improvement with MiraLax.      Plan:  Psyllium supplementation daily  If no improvement with fiber, start miralax daily  F/u in 3 months  Due for colon cancer screening in 2028, can do cologuard/fit testing    Talisha Whitlock MD  Gastroenterology and Hepatology    No orders of the defined types were placed in this encounter.

## 2024-05-07 NOTE — PATIENT INSTRUCTIONS
Take psyllium husk fiber (such as metamucil) 2 spoonfuls every morning.  Take miralax (a capful) every morning

## 2024-06-20 ENCOUNTER — OFFICE VISIT (OUTPATIENT)
Dept: PODIATRY | Facility: CLINIC | Age: 64
End: 2024-06-20
Payer: MEDICARE

## 2024-06-20 VITALS
HEART RATE: 79 BPM | WEIGHT: 168.56 LBS | DIASTOLIC BLOOD PRESSURE: 72 MMHG | HEIGHT: 60 IN | SYSTOLIC BLOOD PRESSURE: 133 MMHG | BODY MASS INDEX: 33.09 KG/M2

## 2024-06-20 DIAGNOSIS — B35.1 ONYCHOMYCOSIS OF TOENAIL: ICD-10-CM

## 2024-06-20 DIAGNOSIS — R20.8 BURNING SENSATION OF TOE AND FOOT: Primary | ICD-10-CM

## 2024-06-20 DIAGNOSIS — M79.676 PAIN DUE TO ONYCHOMYCOSIS OF TOENAIL: ICD-10-CM

## 2024-06-20 DIAGNOSIS — L60.0 INGROWN RIGHT BIG TOENAIL: ICD-10-CM

## 2024-06-20 DIAGNOSIS — M79.672 BILATERAL FOOT PAIN: ICD-10-CM

## 2024-06-20 DIAGNOSIS — M79.671 BILATERAL FOOT PAIN: ICD-10-CM

## 2024-06-20 DIAGNOSIS — B35.1 PAIN DUE TO ONYCHOMYCOSIS OF TOENAIL: ICD-10-CM

## 2024-06-20 DIAGNOSIS — G62.9 NEUROPATHY: ICD-10-CM

## 2024-06-20 DIAGNOSIS — L60.8 DISCOLORATION AND THICKENING OF NAILS BOTH FEET: ICD-10-CM

## 2024-06-20 DIAGNOSIS — I87.2 VENOUS INSUFFICIENCY (CHRONIC) (PERIPHERAL): ICD-10-CM

## 2024-06-20 DIAGNOSIS — L60.0 INGROWN LEFT BIG TOENAIL: ICD-10-CM

## 2024-06-20 DIAGNOSIS — I73.9 PAD (PERIPHERAL ARTERY DISEASE): ICD-10-CM

## 2024-06-20 PROCEDURE — 99999 PR PBB SHADOW E&M-EST. PATIENT-LVL IV: CPT | Mod: PBBFAC,,, | Performed by: PODIATRIST

## 2024-06-20 PROCEDURE — 3078F DIAST BP <80 MM HG: CPT | Mod: CPTII,S$GLB,, | Performed by: PODIATRIST

## 2024-06-20 PROCEDURE — 3288F FALL RISK ASSESSMENT DOCD: CPT | Mod: CPTII,S$GLB,, | Performed by: PODIATRIST

## 2024-06-20 PROCEDURE — 3075F SYST BP GE 130 - 139MM HG: CPT | Mod: CPTII,S$GLB,, | Performed by: PODIATRIST

## 2024-06-20 PROCEDURE — 1125F AMNT PAIN NOTED PAIN PRSNT: CPT | Mod: CPTII,S$GLB,, | Performed by: PODIATRIST

## 2024-06-20 PROCEDURE — 99203 OFFICE O/P NEW LOW 30 MIN: CPT | Mod: 25,S$GLB,, | Performed by: PODIATRIST

## 2024-06-20 PROCEDURE — 1159F MED LIST DOCD IN RCRD: CPT | Mod: CPTII,S$GLB,, | Performed by: PODIATRIST

## 2024-06-20 PROCEDURE — 11720 DEBRIDE NAIL 1-5: CPT | Mod: S$GLB,,, | Performed by: PODIATRIST

## 2024-06-20 PROCEDURE — 1101F PT FALLS ASSESS-DOCD LE1/YR: CPT | Mod: CPTII,S$GLB,, | Performed by: PODIATRIST

## 2024-06-20 PROCEDURE — 3008F BODY MASS INDEX DOCD: CPT | Mod: CPTII,S$GLB,, | Performed by: PODIATRIST

## 2024-06-20 NOTE — PROCEDURES
Nail debridement hallux & 2nd toes    Date/Time: 6/20/2024 1:00 PM    Performed by: Kasandra Negrete DPM  Authorized by: Kasandra Negrete DPM    Consent Done?:  Yes (Verbal)    Nail Care Type:  Debride(Right 1st Toe, Right 2nd Toe, Left 1st Toe and Left 2nd Toe)  Patient tolerance:  Patient tolerated the procedure well with no immediate complications

## 2024-06-20 NOTE — PROGRESS NOTES
Subjective:      Patient ID: Fiona Hernandez is a 64 y.o. female.    Chief Complaint: Nail Problem (Bottom of her feet burn)    Fiona is a 64 y.o. female who presents new to the clinic c/o thick & discolored toenails on both feet, B/L hallux & 2nd x 2 months. Fiona is inquiring about tx options. Also, c/o B/L foot pain. Patient rates pain 4/10 on pain scale.  States the bottom of foot/ toes burning; takes 300 mg gabapentin b.i.d. (Rx per PCP is 300 mg x 2 caps t.i.d.).     2 yrs 7/4/22 (after 53 years); has 2 each daughters, granddaughters & great grand.    PCP Sean Early MD 06/11/2024     LLE stents  L back w/out cartilage & pinched nerve.    Past Medical History:   Diagnosis Date    Back pain     Bowel perforation 11/06/2018    Diverticulitis     Hyperlipidemia     Hypertension     Peritoneal cavity free air 11/06/2018     Patient Active Problem List   Diagnosis    Colon cancer screening    Essential hypertension    Venous insufficiency (chronic) (peripheral)    Hypercholesteremia    Tobacco dependence    Generalized anxiety disorder    Bilateral leg swelling    PAOD (peripheral arterial occlusive disease)    Skin infection, bacterial    Dyspnea on exertion    FH: CAD (coronary artery disease)    Pain in both lower extremities    Nocturnal leg cramps    Bilateral leg edema    Edema    Foot pain    PVC (premature ventricular contraction)    COPD, moderate    Closed fracture of right elbow    Precordial pain    Frequent PVCs    PAD (peripheral artery disease)    Near syncope    Vertigo    Dorsalgia      Objective:      Review of Systems   Constitutional: Positive for malaise/fatigue.   Cardiovascular:  Positive for claudication and leg swelling.   Respiratory:  Positive for shortness of breath.    Skin:  Positive for color change, dry skin and nail changes. Negative for itching and rash.   Musculoskeletal:  Positive for back pain, falls, joint pain, myalgias and neck pain. Negative for gout.    Neurological:  Positive for difficulty with concentration, excessive daytime sleepiness and paresthesias.   Psychiatric/Behavioral:  The patient is nervous/anxious.      Physical Exam  Vitals reviewed.   Constitutional:       General: She is not in acute distress.     Appearance: She is well-developed. She is obese.   Cardiovascular:      Pulses:           Dorsalis pedis pulses are 1+ on the right side and 1+ on the left side.   Musculoskeletal:         General: Tenderness present. No swelling or signs of injury.      Right lower leg: Edema present.      Left lower leg: Edema present.   Feet:      Right foot:      Toenail Condition: Right toenails are ingrown. Fungal disease present.     Left foot:      Toenail Condition: Left toenails are ingrown. Fungal disease present.     Comments: Toenails 1st, 2nd B/L are hypertrophic, thickened, dystrophic, discolored.  Tender to distal nail plate pressure, w/out periungual skin abnormality noted.     Skin:     General: Skin is warm and dry.      Capillary Refill: Capillary refill takes 2 to 3 seconds.      Findings: No bruising, erythema or lesion.      Nails: There is no clubbing.   Neurological:      Mental Status: She is alert and oriented to person, place, and time.      Sensory: Sensory deficit present.      Motor: Motor function is intact. No weakness or abnormal muscle tone.      Comments: Paresthesias including burning B/L feet w/ no clearly identified trigger or source; no hyperemia.  No TTP IMS B/L nor on lateral met.head compression.   Psychiatric:         Mood and Affect: Affect normal. Mood is anxious.         Behavior: Behavior normal. Behavior is cooperative.      Comments: Dysphoric mood         Assessment:      Encounter Diagnoses   Name Primary?    Venous insufficiency (chronic) (peripheral)     PAD (peripheral artery disease)     Burning sensation of toe and foot Yes    Onychomycosis of toenail     Ingrown left big toenail     Ingrown right big toenail   "   Discoloration and thickening of nails both feet     Bilateral foot pain     Neuropathy     Pain due to onychomycosis of toenail        Problem List Items Addressed This Visit          Cardiac/Vascular    Venous insufficiency (chronic) (peripheral)    PAD (peripheral artery disease)    Relevant Orders    Nail debridement hallux & 2nd toes     Other Visit Diagnoses       Burning sensation of toe and foot    -  Primary    Onychomycosis of toenail        Relevant Orders    Nail debridement hallux & 2nd toes    Ingrown left big toenail        Relevant Orders    Nail debridement hallux & 2nd toes    Ingrown right big toenail        Relevant Orders    Nail debridement hallux & 2nd toes    Discoloration and thickening of nails both feet        Bilateral foot pain        Neuropathy        Pain due to onychomycosis of toenail               Plan:       Fiona Early" was seen today for nail problem.    Diagnoses and all orders for this visit:    Burning sensation of toe and foot    Venous insufficiency (chronic) (peripheral)    PAD (peripheral artery disease)  -     Nail debridement hallux & 2nd toes    Onychomycosis of toenail  -     Nail debridement hallux & 2nd toes    Ingrown left big toenail  -     Nail debridement hallux & 2nd toes    Ingrown right big toenail  -     Nail debridement hallux & 2nd toes    Discoloration and thickening of nails both feet    Bilateral foot pain    Neuropathy    Pain due to onychomycosis of toenail    I counseled the patient on her conditions, their implications & medical mgmt.    - Shoe inspection. Patient instructed on proper foot hygeine. We discussed wearing proper shoe gear, daily foot inspections, never walking without protective shoe gear, never putting sharp instruments to feet, annual foot exam, sooner p.r.n.     - W/ patient's permission, B/L nails x 4 were aggressively reduced & debrided to their soft tissue attachment mechanically, removing all offending nail and debris. " Utilizing sterile toenail nippers I debrided the offending nail border approximately 3 mm from its edge and carried the nail plate incision down @ an angle in order to wedge out the offending cryptotic portion of the nail plate in toto. The area was cleansed w/ alcohol. Patient tolerated the procedure well & related significant relief.  Patient instructed on appropriate self debridement of nails.  Also provided w/ instructions on OTC topical antifungal Tx options as requested.    Continue gabapentin per PCP (600 mg t.i.d.) - adjust per PCP only.        A total of 33 mins.was spent on chart review, patient visit & documentation.

## 2024-06-25 ENCOUNTER — TELEPHONE (OUTPATIENT)
Dept: PODIATRY | Facility: CLINIC | Age: 64
End: 2024-06-25
Payer: MEDICARE

## 2024-06-25 NOTE — TELEPHONE ENCOUNTER
-Spoke with patient and she states left big toe has dark red spot hasn't come off and feels like gauze stuck in it. Patient will send picture through portal. Verbalized understanding and no further issues discussed. ---- Message from Brigitte Martin sent at 6/25/2024  9:34 AM CDT -----  Regarding: plz advise issue with wound packing  Contact: PT  [phone]  The patient called requesting to speak to Nurse regarding her wound packing after surgery. She is having an issue w/the packing placed on the wound. Please reach out to advise at earliest opportunity     No further information provided    Patient can be contacted @#   [phone]

## 2024-07-31 ENCOUNTER — OFFICE VISIT (OUTPATIENT)
Dept: PODIATRY | Facility: CLINIC | Age: 64
End: 2024-07-31
Payer: MEDICARE

## 2024-07-31 VITALS
DIASTOLIC BLOOD PRESSURE: 78 MMHG | SYSTOLIC BLOOD PRESSURE: 129 MMHG | HEART RATE: 88 BPM | WEIGHT: 167.31 LBS | HEIGHT: 60 IN | BODY MASS INDEX: 32.85 KG/M2

## 2024-07-31 DIAGNOSIS — I73.9 PAD (PERIPHERAL ARTERY DISEASE): ICD-10-CM

## 2024-07-31 DIAGNOSIS — L60.0 ONYCHOMYCOSIS WITH INGROWN TOENAIL: ICD-10-CM

## 2024-07-31 DIAGNOSIS — I87.2 EDEMA OF BOTH LOWER EXTREMITIES DUE TO PERIPHERAL VENOUS INSUFFICIENCY: ICD-10-CM

## 2024-07-31 DIAGNOSIS — M25.579 PAIN AND SWELLING OF ANKLE, UNSPECIFIED LATERALITY: Primary | ICD-10-CM

## 2024-07-31 DIAGNOSIS — B35.1 ONYCHOMYCOSIS WITH INGROWN TOENAIL: ICD-10-CM

## 2024-07-31 DIAGNOSIS — M25.473 PAIN AND SWELLING OF ANKLE, UNSPECIFIED LATERALITY: Primary | ICD-10-CM

## 2024-07-31 PROCEDURE — 1126F AMNT PAIN NOTED NONE PRSNT: CPT | Mod: CPTII,S$GLB,, | Performed by: PODIATRIST

## 2024-07-31 PROCEDURE — 99213 OFFICE O/P EST LOW 20 MIN: CPT | Mod: S$GLB,,, | Performed by: PODIATRIST

## 2024-07-31 PROCEDURE — 3008F BODY MASS INDEX DOCD: CPT | Mod: CPTII,S$GLB,, | Performed by: PODIATRIST

## 2024-07-31 PROCEDURE — 3078F DIAST BP <80 MM HG: CPT | Mod: CPTII,S$GLB,, | Performed by: PODIATRIST

## 2024-07-31 PROCEDURE — 3288F FALL RISK ASSESSMENT DOCD: CPT | Mod: CPTII,S$GLB,, | Performed by: PODIATRIST

## 2024-07-31 PROCEDURE — 99999 PR PBB SHADOW E&M-EST. PATIENT-LVL III: CPT | Mod: PBBFAC,,, | Performed by: PODIATRIST

## 2024-07-31 PROCEDURE — 1159F MED LIST DOCD IN RCRD: CPT | Mod: CPTII,S$GLB,, | Performed by: PODIATRIST

## 2024-07-31 PROCEDURE — 1101F PT FALLS ASSESS-DOCD LE1/YR: CPT | Mod: CPTII,S$GLB,, | Performed by: PODIATRIST

## 2024-07-31 PROCEDURE — 3074F SYST BP LT 130 MM HG: CPT | Mod: CPTII,S$GLB,, | Performed by: PODIATRIST

## 2024-07-31 RX ORDER — CHOLECALCIFEROL (VITAMIN D3) 25 MCG
1000 TABLET ORAL DAILY
COMMUNITY

## 2024-07-31 RX ORDER — MULTIVITAMIN
1 TABLET ORAL DAILY
COMMUNITY

## 2024-08-23 ENCOUNTER — TELEPHONE (OUTPATIENT)
Dept: PODIATRY | Facility: CLINIC | Age: 64
End: 2024-08-23
Payer: MEDICARE

## 2024-08-23 NOTE — TELEPHONE ENCOUNTER
Spoke with patient. As Per Dr Negrete advismadhav:  she has already been prescribed gabapentin 300 mg b.i.d. per her PCP and should follow with them if she needs adjustment of medication. Pt verbalized understanding.

## 2024-08-23 NOTE — TELEPHONE ENCOUNTER
----- Message from Kasandra Negrete DPM sent at 8/23/2024  3:18 PM CDT -----  Regarding: RE: burning sensation bottom of feet, please advise  Contact: PT  [phone]  As I had advised patient, she has already been prescribed gabapentin 300 mg b.i.d. per her PCP and should follow with them if she needs adjustment of medication.  ----- Message -----  From: Catalina Morillo LPN  Sent: 8/23/2024   1:15 PM CDT  To: Kasandra Negrete DPM  Subject: FW: burning sensation bottom of feet, please#      ----- Message -----  From: Brigitte Martin  Sent: 8/23/2024  12:59 PM CDT  To: Penelope GOLDBERG Staff  Subject: burning sensation bottom of feet, please adv#    The patient called requesting  to speak to Nurse. PT states she is requesting to speak to nurse to find out what she can use on the bottom of her feet which she states is burning sensation all the time. Please call and advise at your earliest convenience     [id number] - [place] - [address] AT [facility] [place] [address]  [address] [address]  Phone: [phone] Fax: [phone]    No further information provided    Patient can be contacted @# [phone]

## 2024-09-16 PROBLEM — R07.9 CHEST PAIN: Status: RESOLVED | Noted: 2023-06-12 | Resolved: 2024-09-16

## 2024-09-17 ENCOUNTER — PATIENT OUTREACH (OUTPATIENT)
Dept: ADMINISTRATIVE | Facility: CLINIC | Age: 64
End: 2024-09-17
Payer: MEDICARE

## 2024-09-17 NOTE — PROGRESS NOTES
C3 nurse spoke with Fiona Hernandez  for a [facility]CC Post Discharge follow-up call. [facility] Maryam Heath NP on 9/26/24. The NP will call you to provide a time-frame for the appointment.

## 2024-09-27 ENCOUNTER — OFFICE VISIT (OUTPATIENT)
Dept: HOME HEALTH SERVICES | Facility: CLINIC | Age: 64
End: 2024-09-27
Payer: MEDICARE

## 2024-09-27 VITALS
RESPIRATION RATE: 20 BRPM | HEART RATE: 76 BPM | SYSTOLIC BLOOD PRESSURE: 126 MMHG | TEMPERATURE: 98 F | OXYGEN SATURATION: 98 % | DIASTOLIC BLOOD PRESSURE: 78 MMHG

## 2024-09-27 DIAGNOSIS — E78.00 HYPERCHOLESTEREMIA: ICD-10-CM

## 2024-09-27 DIAGNOSIS — Z82.49 FH: CAD (CORONARY ARTERY DISEASE): ICD-10-CM

## 2024-09-27 DIAGNOSIS — I10 ESSENTIAL HYPERTENSION: Primary | ICD-10-CM

## 2024-09-27 DIAGNOSIS — J44.9 COPD, MODERATE: ICD-10-CM

## 2024-09-27 DIAGNOSIS — F17.200 TOBACCO DEPENDENCE: ICD-10-CM

## 2024-09-28 NOTE — PROGRESS NOTES
[facility]  Medical Chronic Care Home Visit    Encounter Provider: Maryam Heath   PCP: Sean Early MD  Consult Requested By: No ref. provider found    HISTORY OF PRESENT ILLNESS      Patient ID: Fiona Hernandez is a 64 y.o. female is being seen in the home due to physical debility that presents a taxing effort to leave the home, to mitigate high risk of hospital readmission and/or due to the limited availability of reliable or safe options for transportation to the point of access to the provider. Prior to treatment on this visit the chart was reviewed and patient verbal consent was obtained.    HPI: Patient is a 64-year-old female, past medical history of hyperlipidemia, hypertension, peripheral vascular disease coronary artery disease.  Recent hospital admission for chest pain.      Today: Currently denies chest pain, SOB, fevers, chills, cough, congestion.  Denies change in bladder or bowel habits.  Reports taking meds as prescribed.  Reports no acute issues or concerns at this time.  She is aware of upcoming appts with PCP and cardiology and plan on attending.   DECISION MAKING TODAY       Assessment & Plan:  1. Essential hypertension  Overview:  Continue medications as now    Assessment & Plan:  Normotensive  Continue meds as prescribed       2. Tobacco dependence  Overview:  Recommend complete cessation    Assessment & Plan:  Counseled on smoking cessation for 5 minutes.       3. Hypercholesteremia  Assessment & Plan:  Denies chest pain  Continue aspirin and statin       4. COPD, moderate  Assessment & Plan:  Chronic, stable       5. FH: CAD (coronary artery disease)  Overview:  Recent nuclear negative    Assessment & Plan:  Denies chest pain  Continue aspirin and statin              ENVIRONMENT OF CARE      Family and/or Caregiver present at visit?  No  Name of Caregiver: n/a  History provided by: patient    4Ms for Medical Decision-Making in Older Adults    Last Completed EAWV:  None    MOBILITY:  Get Up and Go:       No data to display              Activities of Daily Living:       No data to display              Whisper Test:       No data to display              Disability Status:      11/6/2018     6:00 PM   Disability Status   Are you deaf or do you have serious difficulty hearing? N   Are you blind or do you have serious difficulty seeing, even when wearing glasses? N   Because of a physical, mental, or emotional condition, do you have serious difficulty concentrating, remembering, or making decisions? N   Do you have serious difficulty walking or climbing stairs? N   Do you have difficulty dressing or bathing? N   Because of a physical, mental, or emotional condition, do you have difficulty doing errands alone such as visiting a doctor's office or shopping? N     Nutrition Screening:       No data to display             Screening Score: 0-7 Malnourished, 8-11 At Risk, 12-14 Normal  Fall Risk:      9/27/2024     8:00 AM 7/31/2024     3:30 PM 6/20/2024     1:00 PM   Fall Risk Assessment - Outpatient   Mobility Status Ambulatory Ambulatory Ambulatory   Number of falls 0 0 0   Identified as fall risk False False False       MENTATION:   Depression Patient Health Questionnaire:       No data to display              Has Dementia Dx: No  Has Anxiety Dx: Yes    Cognitive Function Screening:       No data to display                  MEDICATIONS:  High Risk Medications:  Total Active Medications: 2  diazePAM Tab - 10 MG  gabapentin - 300 MG    WHAT MATTERS MOST:  Advance Care Planning   ACP Status:   Patient does not have an ACP conversation on file  Living Will: No  Power of : No  [place]: No    What is most important right now is to focus on avoiding the hospital    Accordingly, we have decided that the best plan to meet the patient's goals includes continuing with treatment      Is patient hospice appropriate: No  (If needed, use PPS <30 or FAST score >7)  Was referral to hospice  placed: No    Impression upon entering the home:  Physical Dwelling: single family home   Appearance of home environment: cleaniness: clean  Functional Status: independent  Mobility: ambulatory  Nutritional access: adequate intake and access  Home Health: No, and does not need it at this time   DME/Supplies: none     Diagnostic tests reviewed/disposition: No diagnosic tests pending after this hospitalization.  Disease/illness education:  signs and symptoms to report   Establishment or re-establishment of referral orders for community resources: No other necessary community resources.   Discussion with other health care providers: No discussion with other health care providers necessary.   Does patient have a PCP at [facility]? Yes   Repatriation plan with PCP? [facility] reason: hospital follow up     Does patient have an ostomy (ileostomy, colostomy, suprapubic catheter, nephrostomy tube, tracheostomy, PEG tube, pleurex catheter, cholecystostomy, etc)? No  Were BPAs reviewed? No    Social History     Socioeconomic History    Marital status:    Tobacco Use    Smoking status: Some Days     Current packs/day: 0.50     Average packs/day: 0.5 packs/day for 20.0 years (10.0 ttl pk-yrs)     Types: Cigarettes    Smokeless tobacco: Never   Substance and Sexual Activity    Alcohol use: Never    Drug use: Not Currently    Sexual activity: Not Currently     Social Determinants of Health     Financial Resource Strain: Medium Risk (9/15/2024)    Overall Financial Resource Strain (CARDIA)     Difficulty of Paying Living Expenses: Somewhat hard   Food Insecurity: No Food Insecurity (9/15/2024)    Hunger Vital Sign     Worried About Running Out of Food in the Last Year: Never true     Ran Out of Food in the Last Year: Never true   Transportation Needs: No Transportation Needs (9/15/2024)    TRANSPORTATION NEEDS     Transportation : No   Physical Activity: Unknown (9/15/2024)    Exercise Vital Sign     Days of Exercise per Week: 0  days   Stress: Stress Concern Present (9/15/2024)    [employer] [place] [employer] - Occupational Stress Questionnaire     Feeling of Stress : To some extent   Housing Stability: Low Risk  (9/15/2024)    Housing Stability Vital Sign     Unable to Pay for Housing in the Last Year: No     Homeless in the Last Year: No         OBJECTIVE:     Vital Signs:  Vitals:    09/27/24 1300   BP: 126/78   Pulse: 76   Resp: 20   Temp: 98 °F (36.7 °C)       Review of Systems   Constitutional:  Negative for chills and fever.   HENT:  Negative for congestion, postnasal drip and rhinorrhea.    Eyes:  Negative for visual disturbance.   Respiratory:  Negative for chest tightness and shortness of breath.    Cardiovascular:  Negative for chest pain, palpitations and leg swelling.   Gastrointestinal:  Negative for abdominal pain, constipation, diarrhea, nausea and vomiting.   Genitourinary:  Negative for difficulty urinating and dysuria.   Musculoskeletal:  Negative for arthralgias and myalgias.   Skin:  Negative for color change and wound.   Neurological:  Negative for dizziness, weakness and headaches.   Psychiatric/Behavioral:  Negative for agitation and confusion.      Physical Exam:  Physical Exam  HENT:      Head: Normocephalic and atraumatic.      Mouth/Throat:      Mouth: Mucous membranes are moist.   Cardiovascular:      Rate and Rhythm: Normal rate.      Pulses: Normal pulses.   Pulmonary:      Effort: No respiratory distress.      Breath sounds: Normal breath sounds. No wheezing.   Abdominal:      General: Bowel sounds are normal.      Palpations: Abdomen is soft.   Musculoskeletal:      Cervical back: Normal range of motion.      Right lower leg: No edema.      Left lower leg: No edema.   Skin:     General: Skin is warm and dry.   Neurological:      Mental Status: She is alert and oriented to person, place, and time. Mental status is at baseline.   Psychiatric:         Mood and Affect: Mood normal.     INSTRUCTIONS  FOR PATIENT:     Scheduled Follow-up, Appts Reviewed with Modifications if Needed: Yes  Future Appointments   Date Time Provider Department Center   10/8/2024 11:30 AM Sean Early MD [facility]   11/15/2024  2:00 PM Tyler Pete MD [facility] CARDIO [facility]   12/24/2024  3:30 PM Talisha Whitlock MD [facility] GASTRO [facility]         Current Outpatient Medications:     amLODIPine (NORVASC) 5 MG tablet, TAKE ONE TABLET BY MOUTH EVERY DAY, Disp: 30 tablet, Rfl: 3    aspirin (ECOTRIN) 81 MG EC tablet, Take 81 mg by mouth once daily., Disp: , Rfl:     atorvastatin (LIPITOR) 80 MG tablet, Take 1 tablet (80 mg total) by mouth once daily., Disp: 90 tablet, Rfl: 3    carvediloL (COREG) 6.25 MG tablet, TAKE ONE TABLET BY MOUTH TWICE DAILY WITH MEALS, Disp: 60 tablet, Rfl: 3    cyanocobalamin (VITAMIN B-12) 1000 MCG tablet, Take 1 tablet (1,000 mcg total) by mouth once daily., Disp: 100 tablet, Rfl: 1    diazePAM (VALIUM) 10 MG Tab, Take 10 mg by mouth 2 (two) times daily as needed., Disp: , Rfl:     ergocalciferol (ERGOCALCIFEROL) 50,000 unit Cap, Take 1 capsule (50,000 Units total) by mouth every 7 days., Disp: 4 capsule, Rfl: 11    fluticasone propionate (FLONASE) 50 mcg/actuation nasal spray, USE ONE SPRAY IN EACH NOSTRIL TWICE DAILY, Disp: 16 g, Rfl: 3    gabapentin (NEURONTIN) 300 MG capsule, Take 2 capsules (600 mg total) by mouth 3 (three) times daily., Disp: 180 capsule, Rfl: 11    loratadine (CLARITIN) 10 mg tablet, Take 1 tablet (10 mg total) by mouth once daily., Disp: 30 tablet, Rfl: 11    multivitamin (ONE DAILY MULTIVITAMIN) per tablet, Take 1 tablet by mouth once daily., Disp: , Rfl:     pantoprazole (PROTONIX) 40 MG tablet, TAKE ONE TABLET BY MOUTH EVERY DAY, Disp: 30 tablet, Rfl: 3    vitamin D (VITAMIN D3) 1000 units Tab, Take 1,000 Units by mouth once daily. (Patient not taking: Reported on 9/17/2024), Disp: , Rfl:     Medication Reconciliation:  Were medications changed during this  appointment? No  Were medications in the home? Yes  Is the patient taking the medications as directed? Yes  Does the patient/caregiver understand the medications and changes? Yes  Does updated med list accurately reflects meds patient is currently taking? Yes    Signature: Maryam Heath NP     Total face-to-face time was 60 min, >50% of this was spent on counseling and coordination of care. The following issues were discussed: primary and secondary diagnoses, co-morbidities, prescribed medications, treatment modalities, importance of compliance with medical advice and directives for follow-up care.

## 2024-10-15 ENCOUNTER — TELEPHONE (OUTPATIENT)
Dept: CARDIOLOGY | Facility: CLINIC | Age: 64
End: 2024-10-15
Payer: MEDICARE

## 2024-10-15 NOTE — TELEPHONE ENCOUNTER
----- Message from Jarocho sent at 10/15/2024 10:43 AM CDT -----  Regarding: appointment access  Contact: [phone]  PT is calling because pt has an appointment today for 10/15/2024 pt will like to get her original appointment back that was scheduled for 11/15/2024 for 2 pm . Please contact pt with an appointment .     Fiona Matute  [phone]

## 2024-10-15 NOTE — TELEPHONE ENCOUNTER
"----- Message from Jose Manuel sent at 10/15/2024  9:57 AM CDT -----  Regarding: call back  Consult/Advisory:        Name Of Caller: Self     Contact Preference?:[phone]     What is the nature of the call?: Calling to speak w/ someone in regards to rescheduling appt       Additional Notes:  "Thank you for all that you do for our patients"  "

## 2024-11-06 ENCOUNTER — HOSPITAL ENCOUNTER (OUTPATIENT)
Dept: RADIOLOGY | Facility: HOSPITAL | Age: 64
Discharge: HOME | End: 2024-11-06
Attending: INTERNAL MEDICINE
Payer: MEDICARE

## 2024-11-06 DIAGNOSIS — Z12.31 SCREENING MAMMOGRAM FOR BREAST CANCER: ICD-10-CM

## 2024-11-06 PROCEDURE — 77063 BREAST TOMOSYNTHESIS BI: CPT | Mod: TC

## 2024-11-18 ENCOUNTER — TELEPHONE (OUTPATIENT)
Dept: CARDIOLOGY | Facility: CLINIC | Age: 64
End: 2024-11-18
Payer: MEDICARE

## 2024-11-18 NOTE — TELEPHONE ENCOUNTER
----- Message from Roseann sent at 11/18/2024  1:42 PM CST -----  Contact: pt @ [phone]  Fiona Hernandez calling regarding Appointment Access  (message) for #pt is calling to see if she can see a doctor and not NP for Cardiology, asking for call back

## 2024-11-18 NOTE — TELEPHONE ENCOUNTER
Spoke with patient, she was inquiring if a doctor is with the practice.  Informed that we anticipate a doctor coming on board, but have not found one to be here full time with the nurse practitioner.  She confirmed she will be at appointment on 12/10/2024.

## 2024-12-09 ENCOUNTER — TELEPHONE (OUTPATIENT)
Dept: CARDIOLOGY | Facility: CLINIC | Age: 64
End: 2024-12-09
Payer: MEDICARE

## 2024-12-09 NOTE — TELEPHONE ENCOUNTER
Spoke with patient, she is requesting to reschedule tomorrow's appointment.  Informed that next available appointment is 03/18/2025.  Patient accepted appointment.

## 2024-12-09 NOTE — TELEPHONE ENCOUNTER
"----- Message from Jose Manuel sent at 12/9/2024 10:33 AM CST -----  Regarding: call back        Reschedule Existing Appointment        Appt Date:12/10     Type of appt: All Appts     Physician:     Reason for rescheduling?  Requesting to r/s for soonest available due to the weather      Caller: Self     Contact Preference:[phone]        Additional Information:  "Thank you for all that you do for our patients"  "

## 2025-01-03 ENCOUNTER — TELEPHONE (OUTPATIENT)
Dept: NEUROLOGY | Facility: CLINIC | Age: 65
End: 2025-01-03
Payer: MEDICARE

## 2025-01-03 RX ORDER — GABAPENTIN 300 MG/1
600 CAPSULE ORAL 3 TIMES DAILY
Qty: 180 CAPSULE | Refills: 11 | Status: SHIPPED | OUTPATIENT
Start: 2025-01-03 | End: 2026-01-03

## 2025-01-03 NOTE — TELEPHONE ENCOUNTER
I spoke with patient, forwarding refill request for Gabapentin to Dr. Payton.  Assisted with scheduling follow up on 4/01/24 @ 10:40 AM.

## 2025-01-14 ENCOUNTER — TELEPHONE (OUTPATIENT)
Dept: GASTROENTEROLOGY | Facility: CLINIC | Age: 65
End: 2025-01-14
Payer: MEDICARE

## 2025-01-14 ENCOUNTER — OFFICE VISIT (OUTPATIENT)
Dept: GASTROENTEROLOGY | Facility: CLINIC | Age: 65
End: 2025-01-14
Payer: MEDICARE

## 2025-01-14 DIAGNOSIS — Z12.11 COLON CANCER SCREENING: Primary | ICD-10-CM

## 2025-01-14 PROCEDURE — 98005 SYNCH AUDIO-VIDEO EST LOW 20: CPT | Mod: 95,,, | Performed by: STUDENT IN AN ORGANIZED HEALTH CARE EDUCATION/TRAINING PROGRAM

## 2025-01-14 NOTE — TELEPHONE ENCOUNTER
----- Message from Jarrell Hodgson sent at 1/14/2025  1:37 PM CST -----    ----- Message -----  From: Jessica Miranda  Sent: 1/14/2025   1:28 PM CST  To: Chinyere MONTENEGRO Staff    Type:  Patient Returning Call    Who Called:Ms Jaimes   Who Left Message for Patient:Ryan   Does the patient know what this is regarding?:yes   Would the patient rather a call back or a response via [employer]? Call   Best Call Back Number: [phone]  Additional Information: none

## 2025-01-14 NOTE — TELEPHONE ENCOUNTER
----- Message from Jarrell Hodgson sent at 1/14/2025  1:08 PM CST -----  Regarding: FW: Appt  Contact: pt [phone]    ----- Message -----  From: Loraine Alcocer  Sent: 1/14/2025  12:08 PM CST  To: Chinyere MONTENEGRO Staff  Subject: Appt                                             Pt is requesting call back to reschedule appt due to flu like symptoms, please call pt at @[phone]

## 2025-01-14 NOTE — PROGRESS NOTES
The patient location is:  Patient Home   The chief complaint leading to consultation is: colon cancer screening discussion  Visit type: Virtual visit with synchronous audio and video  Total time spent with patient: 20 minutes  Each patient to whom he or she provides medical services by telemedicine is:  (1) informed of the relationship between the physician and patient and the respective role of any other health care provider with respect to management of the patient; and (2) notified that he or she may decline to receive medical services by telemedicine and may withdraw from such care at any time.         [facility]    Reason for visit: There were no encounter diagnoses.  Referring Provider/PCP: Sean Early MD    History of Present Illness:  Fiona Hernandez is a 64 y.o. female with a history of hypertension, hyperlipidemia, peripheral artery disease who is presenting for follow-up evaluation of colon cancer screening.  I saw her last in 5/2024 for abdominal pain.  At the time she was having several months of lower abdominal pain that is sharp/crampy radiating to the upper abdomen, followed by the urge to have a bowel movement.  She has bowel movements every day but feels like she does not completely empty with a smaller amounts of stool.  She also gained about 40 lb although she thinks she has not eating as much to cause this weight gain.  Denies blood in the stool, diarrhea, nausea or vomiting.  The pain improves partially after she has a bowel movement.  She has tried stool softeners with minimal improvement.  She had a colonoscopy in 2018, images reviewed, she had 1 small 4 mm polyp sigmoid polyp removed with lift and hot snare (turned out to be hyperplastic hyperplastic) but the colonoscopies complicated by sigmoid and rectal perforation requiring laparoscopy and colonic resection. Colonoscopy report reviewed, op report reviewed.    Last visit I recommended psyllium and MiraLax.  I am not  entirely sure what prompted the visit today but the patient was concerned about getting another colonoscopy.  She asked how she would get polyps removed if she did not have a colonoscopy.  Denies GI symptoms including abdominal pain, nausea, vomiting.  She has a few bowel movements a day, has stool softeners when she has an issue with constipation but that is not very frequent.      Physical Exam:  Constitutional:  not in acute distress and well developed  HENT: Head: Normal, normocephalic, atraumatic.  Eyes: conjunctiva clear and sclera nonicteric  Skin: normal color  Neurological: alert, oriented x3  Psychiatric: mood and affect are within normal limits, pt is a good historian; no memory problems were noted    Laboratory:  Lab Results   Component Value Date/Time    HGB 14.2 11/09/2024 12:46 AM    HGB 12.2 09/15/2024 03:45 AM    AST 20 11/09/2024 12:46 AM    AST 19 09/15/2024 03:45 AM    ALT 18 11/09/2024 12:46 AM    ALT 16 09/15/2024 03:45 AM    BILITOT 0.3 11/09/2024 12:46 AM    BILITOT 0.3 09/15/2024 03:45 AM     Reviewed.  Normal Hgb, LFTs    Imaging:  See HPI.    Endoscopy:  See HPI    Assessment:  Fiona Hernandez is a 64 y.o. female who is presenting for follow-up evaluation of     Problems:  Colon cancer screening    Prior abdominal pain has resolved.  Having regular bowel movements with no issues, occasionally takes stool softeners.  The patient was enquiring about if she had polyps how they would be removed were discovered.  I informed her that the only way to accurately find polyps is a colonoscopy.  She has a history of colonic perforation after colonoscopy in 2018 requiring laparoscopic surgery.  The patient is extremely afraid of undergoing another colonoscopy which is understandable.  He is not due for colon cancer screening until 2028 anyway.  I informed her that colonoscopy is the only way to accurately detect polyps but it is her decision whether she would want to proceed with one.  Cologuard testing  is an option however the patient needs to be aware that if it were to come back positive, she would need a colonoscopy and if she is not willing to proceed with the colonoscopy, there is no point in doing noninvasive colon cancer screening.    Talisha Whitlock MD  Gastroenterology and Hepatology    No orders of the defined types were placed in this encounter.

## 2025-03-20 ENCOUNTER — OFFICE VISIT (OUTPATIENT)
Dept: PODIATRY | Facility: CLINIC | Age: 65
End: 2025-03-20
Payer: MEDICARE

## 2025-03-20 VITALS
DIASTOLIC BLOOD PRESSURE: 74 MMHG | HEIGHT: 60 IN | SYSTOLIC BLOOD PRESSURE: 136 MMHG | BODY MASS INDEX: 30.81 KG/M2 | HEART RATE: 89 BPM | WEIGHT: 156.94 LBS

## 2025-03-20 DIAGNOSIS — B35.1 ONYCHOMYCOSIS WITH INGROWN TOENAIL: Primary | ICD-10-CM

## 2025-03-20 DIAGNOSIS — L60.0 ONYCHOMYCOSIS WITH INGROWN TOENAIL: Primary | ICD-10-CM

## 2025-03-20 DIAGNOSIS — I73.9 PAD (PERIPHERAL ARTERY DISEASE): ICD-10-CM

## 2025-03-20 DIAGNOSIS — L03.032 ONYCHIA OF TOE, LEFT: ICD-10-CM

## 2025-03-20 DIAGNOSIS — M79.675 PAIN OF LEFT GREAT TOE: ICD-10-CM

## 2025-03-20 PROCEDURE — 3075F SYST BP GE 130 - 139MM HG: CPT | Mod: CPTII,S$GLB,, | Performed by: PODIATRIST

## 2025-03-20 PROCEDURE — 99213 OFFICE O/P EST LOW 20 MIN: CPT | Mod: 25,S$GLB,, | Performed by: PODIATRIST

## 2025-03-20 PROCEDURE — 3044F HG A1C LEVEL LT 7.0%: CPT | Mod: CPTII,S$GLB,, | Performed by: PODIATRIST

## 2025-03-20 PROCEDURE — 1125F AMNT PAIN NOTED PAIN PRSNT: CPT | Mod: CPTII,S$GLB,, | Performed by: PODIATRIST

## 2025-03-20 PROCEDURE — 3288F FALL RISK ASSESSMENT DOCD: CPT | Mod: CPTII,S$GLB,, | Performed by: PODIATRIST

## 2025-03-20 PROCEDURE — 3061F NEG MICROALBUMINURIA REV: CPT | Mod: CPTII,S$GLB,, | Performed by: PODIATRIST

## 2025-03-20 PROCEDURE — 1101F PT FALLS ASSESS-DOCD LE1/YR: CPT | Mod: CPTII,S$GLB,, | Performed by: PODIATRIST

## 2025-03-20 PROCEDURE — 3078F DIAST BP <80 MM HG: CPT | Mod: CPTII,S$GLB,, | Performed by: PODIATRIST

## 2025-03-20 PROCEDURE — 3066F NEPHROPATHY DOC TX: CPT | Mod: CPTII,S$GLB,, | Performed by: PODIATRIST

## 2025-03-20 PROCEDURE — 3008F BODY MASS INDEX DOCD: CPT | Mod: CPTII,S$GLB,, | Performed by: PODIATRIST

## 2025-03-20 PROCEDURE — 1159F MED LIST DOCD IN RCRD: CPT | Mod: CPTII,S$GLB,, | Performed by: PODIATRIST

## 2025-03-20 PROCEDURE — 11720 DEBRIDE NAIL 1-5: CPT | Mod: S$GLB,,, | Performed by: PODIATRIST

## 2025-03-20 PROCEDURE — 99999 PR PBB SHADOW E&M-EST. PATIENT-LVL III: CPT | Mod: PBBFAC,,, | Performed by: PODIATRIST

## 2025-03-20 RX ORDER — MUPIROCIN 20 MG/G
2 OINTMENT TOPICAL DAILY
COMMUNITY
Start: 2025-03-12

## 2025-03-20 RX ORDER — CEPHALEXIN 500 MG/1
500 CAPSULE ORAL 2 TIMES DAILY
COMMUNITY

## 2025-03-20 NOTE — PROGRESS NOTES
Subjective:      Patient ID: Fiona Hernandez is a 65 y.o. female.    Chief Complaint: Toe Pain (Right and left great toe) and Fungus (Right great toe)    Patient is added on to schedule for B/L toe pain L>R hallux. Pain level up to 6/10. Had gone to ED > 1 wk.ago post pedicure. Put on Keflex 500 mg bid.  7/31/24  Patient had been here about 6 weeks ago & had debridement of hallux & 2nd toes due to IGTN; IGTN no longer symptomatic. States noticed improvement w/ Vicks to toenails w/out white, cracking nails now. Worried about ankle swelling when took off her socks/ footies. States has random scrapes & bruises but states not from falling.   6/20/24  Fiona is a 65 y.o. female who presents new to the clinic c/o thick & discolored toenails on both feet, B/L hallux & 2nd x 2 months. Fiona is inquiring about tx options. Also, c/o B/L foot pain. Patient rates pain 4/10 on pain scale.  States the bottom of foot/ toes burning; takes 300 mg gabapentin b.i.d..     2 yrs 7/4/22 (after 53 years); has 2 each daughters, granddaughters & great grand.    PCP Sean Early MD 03/11/2025     LLE stents  L back w/out cartilage & pinched nerve.    Past Medical History:   Diagnosis Date    Back pain     Bowel perforation 11/06/2018    Diverticulitis     Hyperlipidemia     Hypertension     Hyperthyroidism     Peritoneal cavity free air 11/06/2018     Patient Active Problem List   Diagnosis    Colon cancer screening    Essential hypertension    Venous insufficiency (chronic) (peripheral)    Hypercholesteremia    Tobacco dependence    Generalized anxiety disorder    Bilateral leg swelling    PAOD (peripheral arterial occlusive disease)    Skin infection, bacterial    Dyspnea on exertion    FH: CAD (coronary artery disease)    Pain in both lower extremities    Nocturnal leg cramps    Bilateral leg edema    Edema    Foot pain    PVC (premature ventricular contraction)    COPD, moderate    Closed fracture of right elbow     Precordial pain    Frequent PVCs    PAD (peripheral artery disease)    Near syncope    Vertigo    Dorsalgia      Objective:      Review of Systems   Constitutional: Positive for malaise/fatigue and weight gain.   Cardiovascular:  Positive for claudication and leg swelling.   Respiratory:  Positive for shortness of breath.    Hematologic/Lymphatic: Bruises/bleeds easily.   Skin:  Positive for color change, dry skin and nail changes. Negative for itching and rash.   Musculoskeletal:  Positive for back pain, falls, joint pain, myalgias and neck pain. Negative for gout.   Neurological:  Positive for difficulty with concentration, excessive daytime sleepiness and paresthesias.   Psychiatric/Behavioral:  The patient is nervous/anxious.      Physical Exam  Vitals reviewed.   Constitutional:       General: She is awake. She is not in acute distress.     Appearance: She is well-developed. She is obese.   Cardiovascular:      Pulses:           Dorsalis pedis pulses are 1+ on the right side and 1+ on the left side.      Comments: Outline distal ankle where socks indented B/L.  Musculoskeletal:         General: Swelling and tenderness present. No signs of injury.      Right lower le+ Edema present.      Left lower le+ Edema present.   Feet:      Right foot:      Toenail Condition: Right toenails are ingrown. Fungal disease present.     Left foot:      Toenail Condition: Left toenails are ingrown. Fungal disease present.     Comments: Toenails 1st, 2nd B/L are thickened, dystrophic, discolored & B/L hallux cryptosis w/ tenderness to distal nail plate pressure, w/out periungual skin abnormality noted.     Skin:     General: Skin is warm and dry.      Capillary Refill: Capillary refill takes 2 to 3 seconds.      Findings: No bruising, erythema or lesion.      Nails: There is no clubbing.      Comments: Hypertrophy of ungual labia lateral L hallux nail border W/out exudate nor purulence.  Mycotic changes & cryptosis  "significant compared to contralateral/ R hallux.   Neurological:      Mental Status: She is oriented to person, place, and time.      Sensory: Sensory deficit present.      Motor: Motor function is intact. No weakness or abnormal muscle tone.      Gait: Gait is intact. Gait normal.      Comments: Paresthesias including burning B/L feet w/ no clearly identified trigger or source; no hyperemia.  On Gabapentin.  No TTP IMS B/L nor on lateral met.head compression.   Psychiatric:         Attention and Perception: She is inattentive.         Mood and Affect: Affect normal. Mood is anxious.         Behavior: Behavior normal. Behavior is cooperative.      Comments: Dysphoric mood         Assessment:      Encounter Diagnoses   Name Primary?    Onychomycosis with ingrown toenail Yes    Pain of left great toe     Onychia of toe, left     PAD (peripheral artery disease)        Problem List Items Addressed This Visit          Cardiac/Vascular    PAD (peripheral artery disease)     Other Visit Diagnoses         Onychomycosis with ingrown toenail    -  Primary    Relevant Orders    Nail debridement B/L hallux      Pain of left great toe        Relevant Orders    Nail debridement B/L hallux      Onychia of toe, left               Plan:       Fiona Early" was seen today for toe pain and fungus.    Diagnoses and all orders for this visit:    Onychomycosis with ingrown toenail  -     Nail debridement B/L hallux    Pain of left great toe  -     Nail debridement B/L hallux    Onychia of toe, left    PAD (peripheral artery disease)    I counseled the patient on her conditions, their implications & medical mgmt.    - Shoe inspection. Patient instructed on proper foot hygeine. We discussed wearing proper shoe gear, daily foot inspections, never walking w/out protective shoe gear, annual foot exam, sooner p.r.n.     W/ patient's permission, the toenails mentioned above were aggressively reduced & debrided to the soft tissue attachment " using a nail nipper, removing all offending nail & debris. I debrided the offending nail borders B/L hallux approximately 3 mm from its edge & carried the nail plate incision down @ an angle in order to wedge out the offending cryptotic portion of the nail plate in toto. The area was cleansed w/ alcohol. Patient tolerated the procedure well & related relief of discomfort.  Patient was advised on appropriate self-debridement of nails w/ correct instrumentation to prevent recurrence of ssx.   Discussed permanent nail border removal under LA, w/ phenol-alcohol chemical matrixectomy, if recurrent & difficult to self manage, after resolution or before recurrence of ssx.     Patient to continue w/ OTC topical antifungal Tx to nails - previously provided w/ options prn.        A total of 25 mins.was spent on chart review, patient visit & documentation.

## 2025-03-30 NOTE — PROCEDURES
Nail debridement B/L hallux    Date/Time: 3/20/2025 4:15 PM    Performed by: Kasandra Negrete DPM  Authorized by: Kasandra Negrete DPM    Consent Done?:  Yes (Verbal)    Nail Care Type:  Debride(Left 1st Toe and Right 1st Toe)  Patient tolerance:  Patient tolerated the procedure well with no immediate complications

## 2025-05-04 NOTE — PROGRESS NOTES
Subjective:      Patient ID: Fiona Hernandez is a 65 y.o. female.    Chief Complaint: Toe Pain (Left great toe.) and Nail Problem    Rescheduled from 4/30/25 for f/u IGTN B/L hallux, debrided. L toe better w/ no IGTN. Using Vicks. Has 'flea' bites on leg from grass. Has leg pain only; no pain feet  3/20/25  Patient is added on to schedule for B/L toe pain L>R hallux. Pain level up to 6/10. Had gone to ED > 1 wk.ago post pedicure. Put on Keflex 500 mg bid.  7/31/24  Patient had been here about 6 weeks ago & had debridement of hallux & 2nd toes due to IGTN; IGTN no longer symptomatic. States noticed improvement w/ Vicks to toenails w/out white, cracking nails now. Worried about ankle swelling when took off her socks/ footies. States has random scrapes & bruises but states not from falling.   6/20/24  Fiona is a 65 y.o. female who presents new to the clinic c/o thick & discolored toenails on both feet, B/L hallux & 2nd x 2 months. Fiona is inquiring about tx options. Also, c/o B/L foot pain. Patient rates pain 4/10 on pain scale.  States the bottom of foot/ toes burning; takes 300 mg gabapentin b.i.d..     2 yrs 7/4/22 (after 53 years); has 2 each daughters, granddaughters & great grand.    PCP Sean Early MD 03/11/2025, due 6/5/25     LLE stents  L back w/out cartilage & pinched nerve.    Past Medical History:   Diagnosis Date    Back pain     Bowel perforation 11/06/2018    Diverticulitis     Hyperlipidemia     Hypertension     Hyperthyroidism     Peritoneal cavity free air 11/06/2018     Patient Active Problem List   Diagnosis    Colon cancer screening    Essential hypertension    Venous insufficiency (chronic) (peripheral)    Hypercholesteremia    Tobacco dependence    Generalized anxiety disorder    Bilateral leg swelling    PAOD (peripheral arterial occlusive disease)    Skin infection, bacterial    Dyspnea on exertion    FH: CAD (coronary artery disease)    Pain in both lower extremities     Nocturnal leg cramps    Bilateral leg edema    Edema    Foot pain    PVC (premature ventricular contraction)    COPD, moderate    Closed fracture of right elbow    Precordial pain    Frequent PVCs    PAD (peripheral artery disease)    Near syncope    Vertigo    Dorsalgia      Objective:      Review of Systems   Constitutional: Positive for malaise/fatigue and weight gain.   Cardiovascular:  Positive for claudication and leg swelling.   Respiratory:  Positive for shortness of breath.    Hematologic/Lymphatic: Bruises/bleeds easily.   Skin:  Positive for color change, dry skin and nail changes. Negative for itching and rash.   Musculoskeletal:  Positive for back pain, falls, joint pain, myalgias and neck pain. Negative for gout.   Neurological:  Positive for difficulty with concentration, excessive daytime sleepiness and paresthesias.   Psychiatric/Behavioral:  The patient is nervous/anxious.      Physical Exam  Vitals reviewed.   Constitutional:       General: She is awake. She is not in acute distress.     Appearance: She is well-developed. She is obese.   Cardiovascular:      Pulses:           Dorsalis pedis pulses are 1+ on the right side and 1+ on the left side.      Comments: Outline distal ankle where socks indented B/L.  Musculoskeletal:         General: No swelling or tenderness.      Right lower le+ Edema present.      Left lower le+ Edema present.   Feet:      Right foot:      Toenail Condition: Right toenails are ingrown. Fungal disease present.     Left foot:      Toenail Condition: Left toenails are ingrown. Fungal disease present.     Comments: Toenails 1st, 2nd & 5th B/L are thickened, dystrophic, discolored w/ B/L hallux cryptosis; resolution of onychia lateral L hallux w/out periungual skin abnormality noted.     Skin:     General: Skin is warm and dry.      Capillary Refill: Capillary refill takes 2 to 3 seconds.      Findings: No bruising, erythema or lesion.      Nails: There is no  "clubbing.      Comments: Resolved hypertrophy of ungual labia lateral L hallux nail border.  Mycotic changes & cryptosis > R hallux.   Neurological:      Mental Status: She is oriented to person, place, and time.      Sensory: Sensory deficit present.      Motor: Motor function is intact. No weakness or abnormal muscle tone.      Gait: Gait is intact. Gait normal.      Comments: Paresthesias including burning & tingling B/L feet during day w/ no clearly identified trigger or source; no hyperemia. On Gabapentin.  No TTP IMS B/L nor on lateral met.head compression.   Psychiatric:         Attention and Perception: She is attentive.         Mood and Affect: Affect normal. Mood is anxious.         Speech: Speech is tangential.         Behavior: Behavior normal. Behavior is cooperative.      Comments: Dysphoric mood         Assessment:      Encounter Diagnoses   Name Primary?    PAOD (peripheral arterial occlusive disease) Yes    Bilateral leg swelling     Venous insufficiency (chronic) (peripheral)     Pain due to onychomycosis of toenail        Problem List Items Addressed This Visit          Cardiac/Vascular    Venous insufficiency (chronic) (peripheral)    PAOD (peripheral arterial occlusive disease) - Primary       Other    Bilateral leg swelling     Other Visit Diagnoses         Pain due to onychomycosis of toenail               Plan:       Fiona Early" was seen today for toe pain and nail problem.    Diagnoses and all orders for this visit:    PAOD (peripheral arterial occlusive disease)    Bilateral leg swelling    Venous insufficiency (chronic) (peripheral)    Pain due to onychomycosis of toenail    I counseled the patient on her conditions, their implications & medical mgmt.    - Shoe inspection. Patient instructed on proper foot hygeine. We discussed wearing proper shoe gear, daily foot inspections, never walking w/out protective shoe gear, annual foot exam, sooner p.r.n.     Patient was advised on " appropriate self-debridement of nails w/ correct instrumentation to prevent recurrence of ssx.   Discussed permanent nail border removal under LA, w/ phenol-alcohol chemical matrixectomy, if recurrent & difficult to self manage, after resolution or before recurrence of ssx.     Patient to continue w/ OTC topical antifungal Tx to nails prn.        A total of 21 mins.was spent on chart review, patient visit & documentation.

## 2025-05-06 ENCOUNTER — OFFICE VISIT (OUTPATIENT)
Dept: PODIATRY | Facility: CLINIC | Age: 65
End: 2025-05-06
Payer: MEDICARE

## 2025-05-06 VITALS
HEIGHT: 60 IN | BODY MASS INDEX: 30.83 KG/M2 | HEART RATE: 91 BPM | WEIGHT: 157.06 LBS | DIASTOLIC BLOOD PRESSURE: 74 MMHG | SYSTOLIC BLOOD PRESSURE: 142 MMHG

## 2025-05-06 DIAGNOSIS — I77.9 PAOD (PERIPHERAL ARTERIAL OCCLUSIVE DISEASE): Primary | ICD-10-CM

## 2025-05-06 DIAGNOSIS — M79.676 PAIN DUE TO ONYCHOMYCOSIS OF TOENAIL: ICD-10-CM

## 2025-05-06 DIAGNOSIS — I87.2 VENOUS INSUFFICIENCY (CHRONIC) (PERIPHERAL): ICD-10-CM

## 2025-05-06 DIAGNOSIS — B35.1 PAIN DUE TO ONYCHOMYCOSIS OF TOENAIL: ICD-10-CM

## 2025-05-06 DIAGNOSIS — M79.89 LEG SWELLING: ICD-10-CM

## 2025-05-06 PROCEDURE — 99999 PR PBB SHADOW E&M-EST. PATIENT-LVL III: CPT | Mod: PBBFAC,,, | Performed by: PODIATRIST

## 2025-05-06 PROCEDURE — 3077F SYST BP >= 140 MM HG: CPT | Mod: CPTII,S$GLB,, | Performed by: PODIATRIST

## 2025-05-06 PROCEDURE — 3061F NEG MICROALBUMINURIA REV: CPT | Mod: CPTII,S$GLB,, | Performed by: PODIATRIST

## 2025-05-06 PROCEDURE — 3008F BODY MASS INDEX DOCD: CPT | Mod: CPTII,S$GLB,, | Performed by: PODIATRIST

## 2025-05-06 PROCEDURE — 3066F NEPHROPATHY DOC TX: CPT | Mod: CPTII,S$GLB,, | Performed by: PODIATRIST

## 2025-05-06 PROCEDURE — 1159F MED LIST DOCD IN RCRD: CPT | Mod: CPTII,S$GLB,, | Performed by: PODIATRIST

## 2025-05-06 PROCEDURE — 3044F HG A1C LEVEL LT 7.0%: CPT | Mod: CPTII,S$GLB,, | Performed by: PODIATRIST

## 2025-05-06 PROCEDURE — 3078F DIAST BP <80 MM HG: CPT | Mod: CPTII,S$GLB,, | Performed by: PODIATRIST

## 2025-05-06 PROCEDURE — 99213 OFFICE O/P EST LOW 20 MIN: CPT | Mod: S$GLB,,, | Performed by: PODIATRIST

## 2025-05-06 PROCEDURE — 3288F FALL RISK ASSESSMENT DOCD: CPT | Mod: CPTII,S$GLB,, | Performed by: PODIATRIST

## 2025-05-06 PROCEDURE — 1101F PT FALLS ASSESS-DOCD LE1/YR: CPT | Mod: CPTII,S$GLB,, | Performed by: PODIATRIST

## 2025-05-06 PROCEDURE — 1125F AMNT PAIN NOTED PAIN PRSNT: CPT | Mod: CPTII,S$GLB,, | Performed by: PODIATRIST

## 2025-06-09 ENCOUNTER — TELEPHONE (OUTPATIENT)
Dept: GASTROENTEROLOGY | Facility: CLINIC | Age: 65
End: 2025-06-09
Payer: MEDICARE

## 2025-06-09 NOTE — TELEPHONE ENCOUNTER
"Copied from CRM #[id number]. Topic: Appointments - Appointment Access  >> Jun 9, 2025  8:59 AM Jose Manuel wrote:  Consult/Advisory:        Name Of Caller: Self     Contact Preference?:[phone]     What is the nature of the call?: Calling to speak w/  someone in regards to scheduling appt requesting call back      Additional Notes:  "Thank you for all that you do for our patients"  "

## 2025-06-30 ENCOUNTER — TELEPHONE (OUTPATIENT)
Dept: GASTROENTEROLOGY | Facility: CLINIC | Age: 65
End: 2025-06-30
Payer: MEDICARE

## 2025-07-08 ENCOUNTER — TELEPHONE (OUTPATIENT)
Dept: NEUROLOGY | Facility: CLINIC | Age: 65
End: 2025-07-08

## 2025-07-08 NOTE — TELEPHONE ENCOUNTER
Attempted to return patient's call, left voicemail asking for a return call, my call back number ([phone]) supplied on voicemail.

## 2025-07-08 NOTE — TELEPHONE ENCOUNTER
I left a voicemail for patient, Dr Payton does not currently have an in office appointment available, she can return call and I will assist with scheduling with a different provider at another location.  My call back number ([phone]) supplied on [employer].

## 2025-07-09 ENCOUNTER — TELEPHONE (OUTPATIENT)
Dept: NEUROLOGY | Facility: CLINIC | Age: 65
End: 2025-07-09
Payer: MEDICARE

## 2025-07-09 ENCOUNTER — TELEPHONE (OUTPATIENT)
Dept: OBSTETRICS AND GYNECOLOGY | Facility: CLINIC | Age: 65
End: 2025-07-09
Payer: MEDICARE

## 2025-07-09 NOTE — TELEPHONE ENCOUNTER
Attempted to reach patient's call, left voicemail asking for a return call.  My call back number ([phone]) supplied on voicemail.

## 2025-07-09 NOTE — TELEPHONE ENCOUNTER
I spoke with patient, declined virtual appointment, declined appt at another [facility] outside of [place], patient will contact a provider outside of [facility] in [place] for an appointment.

## 2025-07-25 ENCOUNTER — TELEPHONE (OUTPATIENT)
Dept: PAIN MEDICINE | Facility: CLINIC | Age: 65
End: 2025-07-25
Payer: MEDICARE

## 2025-07-25 NOTE — TELEPHONE ENCOUNTER
Copied from CRM #[id number]. Topic: Appointments - Appointment Access  >> Jul 25, 2025 12:21 PM Robert wrote:  Consult/Advisory     Name Of Caller:Fiona        Contact Preference:[phone]     Nature of call: Calling to see if her referral was received from Dr. Jennifer Gutiérrez

## 2025-07-25 NOTE — TELEPHONE ENCOUNTER
Callback message left. Inform patient the referral has not been received. Review with her the current pain issues she is experiencing. Inform her of the role of IPM, plans of care, and treatment options.

## 2025-07-28 DIAGNOSIS — G89.29 OTHER CHRONIC PAIN: ICD-10-CM

## 2025-07-28 DIAGNOSIS — M54.50 LOW BACK PAIN, UNSPECIFIED: Primary | ICD-10-CM

## 2025-08-12 ENCOUNTER — TELEPHONE (OUTPATIENT)
Dept: PODIATRY | Facility: CLINIC | Age: 65
End: 2025-08-12
Payer: MEDICARE

## 2025-08-27 ENCOUNTER — TELEPHONE (OUTPATIENT)
Dept: PODIATRY | Facility: CLINIC | Age: 65
End: 2025-08-27
Payer: MEDICARE